# Patient Record
Sex: MALE | Race: WHITE | NOT HISPANIC OR LATINO | Employment: OTHER | URBAN - METROPOLITAN AREA
[De-identification: names, ages, dates, MRNs, and addresses within clinical notes are randomized per-mention and may not be internally consistent; named-entity substitution may affect disease eponyms.]

---

## 2019-09-25 ENCOUNTER — OFFICE VISIT (OUTPATIENT)
Dept: FAMILY MEDICINE CLINIC | Facility: CLINIC | Age: 44
End: 2019-09-25

## 2019-09-25 VITALS
HEIGHT: 73 IN | DIASTOLIC BLOOD PRESSURE: 76 MMHG | TEMPERATURE: 98.2 F | WEIGHT: 206.8 LBS | SYSTOLIC BLOOD PRESSURE: 122 MMHG | OXYGEN SATURATION: 99 % | HEART RATE: 68 BPM | BODY MASS INDEX: 27.41 KG/M2 | RESPIRATION RATE: 16 BRPM

## 2019-09-25 DIAGNOSIS — M79.672 LEFT FOOT PAIN: ICD-10-CM

## 2019-09-25 DIAGNOSIS — G62.9 NEUROPATHY: Primary | ICD-10-CM

## 2019-09-25 PROCEDURE — 99203 OFFICE O/P NEW LOW 30 MIN: CPT | Performed by: PHYSICIAN ASSISTANT

## 2019-09-25 RX ORDER — ERGOCALCIFEROL 1.25 MG/1
50000 CAPSULE ORAL WEEKLY
Refills: 5 | COMMUNITY
Start: 2019-09-03 | End: 2019-10-14 | Stop reason: SDUPTHER

## 2019-09-25 RX ORDER — METHADONE HYDROCHLORIDE 10 MG/1
TABLET ORAL
COMMUNITY
End: 2022-10-17 | Stop reason: ALTCHOICE

## 2019-09-25 RX ORDER — OMEPRAZOLE 20 MG/1
20 CAPSULE, DELAYED RELEASE ORAL DAILY
Refills: 5 | COMMUNITY
Start: 2019-09-03 | End: 2019-10-14 | Stop reason: SDUPTHER

## 2019-09-25 RX ORDER — CETIRIZINE HYDROCHLORIDE 10 MG/1
TABLET ORAL
Refills: 5 | COMMUNITY
Start: 2019-09-03 | End: 2020-03-09 | Stop reason: SDUPTHER

## 2019-09-25 RX ORDER — DICLOFENAC SODIUM 75 MG/1
1 TABLET, DELAYED RELEASE ORAL 2 TIMES DAILY
Refills: 2 | COMMUNITY
Start: 2019-09-03 | End: 2019-10-14 | Stop reason: SDUPTHER

## 2019-09-25 RX ORDER — GABAPENTIN 800 MG/1
1 TABLET ORAL 3 TIMES DAILY
Refills: 5 | COMMUNITY
Start: 2019-07-31 | End: 2020-08-05 | Stop reason: SDUPTHER

## 2019-09-25 NOTE — PROGRESS NOTES
Ivan Manning is a 44 y.o. male  Establish Care (Lives in [place]-former PCP left practice) and Neuropathy (Bilat feet. RF gabapentin 800mg TID)      History of Present Illness  Patient is a 44-year-old white patient male who comes in complaining of neuropathy chronic history needs refill of gabapentin sent secondary to a fracture of his left foot he has chronic foot pain bilaterally patient like referral to podiatry  The following portions of the patient's history were reviewed and updated as appropriate: allergies, current medications, past social history and problem list    Review of Systems   Constitutional: Negative for appetite change, diaphoresis, fatigue and unexpected weight change.   Eyes: Negative for visual disturbance.   Respiratory: Negative for cough, chest tightness and shortness of breath.    Cardiovascular: Negative for chest pain, palpitations and leg swelling.   Gastrointestinal: Negative for diarrhea, nausea and vomiting.   Endocrine: Negative for polydipsia, polyphagia and polyuria.   Skin: Negative for color change and rash.   Neurological: Negative for dizziness, syncope, weakness, light-headedness, numbness and headaches.       Objective     Vitals:    09/25/19 1119   BP: 122/76   Pulse: 68   Resp: 16   Temp: 98.2 °F (36.8 °C)   SpO2: 99%       Physical Exam   Constitutional: He appears well-developed and well-nourished.   Neck: Neck supple. No JVD present. No thyromegaly present.   Cardiovascular: Normal rate, regular rhythm, normal heart sounds, intact distal pulses and normal pulses.   No murmur heard.  Pulmonary/Chest: Effort normal and breath sounds normal. No respiratory distress.   Abdominal: Soft. Bowel sounds are normal. There is no hepatosplenomegaly. There is no tenderness.   Musculoskeletal: He exhibits no edema.        Lymphadenopathy:     He has no cervical adenopathy.   Neurological: No sensory deficit.   Skin: Skin is warm and dry. He is not diaphoretic.    Nursing note and vitals reviewed.      Assessment/Plan     Diagnoses and all orders for this visit:    Neuropathy    Left foot pain  -     Ambulatory Referral to Podiatry    Other orders  -     cetirizine (zyrTEC) 10 MG tablet; TAKE ONE TABLET BY MOUTH ONCE DAILY FOR ALLERGY  -     gabapentin (NEURONTIN) 800 MG tablet; Take 1 tablet by mouth 3 (Three) Times a Day.  -     diclofenac (VOLTAREN) 75 MG EC tablet; Take 1 tablet by mouth 2 (Two) Times a Day.  -     vitamin D (ERGOCALCIFEROL) 92904 units capsule capsule; Take 50,000 Units by mouth 1 (One) Time Per Week.  -     methadone (DOLOPHINE) 10 MG tablet; methadone 10 mg tablet   Take 8 tablets every day by oral route as directed.  -     metoprolol tartrate (LOPRESSOR) 25 MG tablet; TAKE ONE TABLET BY MOUTH TWO TIMES A DAY (EVERY 12 HOURS)  -     omeprazole (priLOSEC) 20 MG capsule; Take 20 mg by mouth Daily.    #1 gabapentin 800 mg 3 times daily dispense 90×3 refills    Follow-up if no better

## 2019-10-14 ENCOUNTER — TELEPHONE (OUTPATIENT)
Dept: FAMILY MEDICINE CLINIC | Facility: CLINIC | Age: 44
End: 2019-10-14

## 2019-10-14 RX ORDER — OMEPRAZOLE 20 MG/1
20 CAPSULE, DELAYED RELEASE ORAL DAILY
Qty: 30 CAPSULE | Refills: 3 | Status: SHIPPED | OUTPATIENT
Start: 2019-10-14 | End: 2020-03-05

## 2019-10-14 RX ORDER — ERGOCALCIFEROL 1.25 MG/1
50000 CAPSULE ORAL WEEKLY
Qty: 5 CAPSULE | Refills: 3 | Status: SHIPPED | OUTPATIENT
Start: 2019-10-14 | End: 2020-03-03 | Stop reason: SDUPTHER

## 2019-10-14 RX ORDER — DICLOFENAC SODIUM 75 MG/1
75 TABLET, DELAYED RELEASE ORAL 2 TIMES DAILY
Qty: 60 TABLET | Refills: 3 | Status: SHIPPED | OUTPATIENT
Start: 2019-10-14 | End: 2020-03-05

## 2019-10-14 NOTE — TELEPHONE ENCOUNTER
----- Message from Camille Starkey sent at 10/14/2019  2:07 PM EDT -----  Contact: PT.  PT. IS NEEDING MEDICATIONS OF: diclofenac (VOLTAREN) 75 MG EC tablet  2 9/3/2019    Sig - Route: Take 1 tablet by mouth 2 (Two) Times a Day. - Oral     &  omeprazole (priLOSEC) 20 MG capsule  5 9/3/2019    Sig - Route: Take 20 mg by mouth Daily. - Oral     & metoprolol tartrate (LOPRESSOR) 25 MG tablet  3 9/3/2019    Sig: TAKE ONE TABLET BY MOUTH TWO TIMES A DAY (EVERY 12 HOURS)   Class: Historical Med     & vitamin D (ERGOCALCIFEROL) 06724 units capsule capsule  5 9/3/2019    Sig - Route: Take 50,000 Units by mouth 1 (One) Time Per Week. - Oral   Class: Historical Med     RX=[facility] - [address] - [phone]  - [phone] (Phone)  [phone] (Fax)    PT. CAN BE REACHED @ CELL PHONE #: [phone] OR ABOVE HOME #.

## 2020-01-09 ENCOUNTER — OFFICE VISIT (OUTPATIENT)
Dept: FAMILY MEDICINE CLINIC | Facility: CLINIC | Age: 45
End: 2020-01-09

## 2020-01-09 VITALS
SYSTOLIC BLOOD PRESSURE: 116 MMHG | OXYGEN SATURATION: 98 % | HEIGHT: 73 IN | BODY MASS INDEX: 27.73 KG/M2 | WEIGHT: 209.2 LBS | HEART RATE: 65 BPM | RESPIRATION RATE: 16 BRPM | DIASTOLIC BLOOD PRESSURE: 74 MMHG

## 2020-01-09 DIAGNOSIS — D17.9 MULTIPLE LIPOMAS: ICD-10-CM

## 2020-01-09 DIAGNOSIS — G62.9 NEUROPATHY: Primary | ICD-10-CM

## 2020-01-09 PROCEDURE — 99213 OFFICE O/P EST LOW 20 MIN: CPT | Performed by: PHYSICIAN ASSISTANT

## 2020-01-14 NOTE — PROGRESS NOTES
Subjective   Hussein Kerri is a 44 y.o. male  Neuropathy (RF gabapentin 800mg TID) and Knots (Bilat flanks under skin, tender)      History of Present Illness  Patient is a 44-year-old white male who comes in complaining of neuropathy takes gabapentin 3 mg 3 times a day meds working well no problems or complaints pay states meds currently working well symptoms are stable neuropathy has been controlled    Patient complains of knots on abdomen and back notes couple knots couple months ago he states he notices it more when he loses weight but not painful  The following portions of the patient's history were reviewed and updated as appropriate: allergies, current medications, past social history and problem list    Review of Systems   Constitutional: Negative for fatigue and unexpected weight change.   Respiratory: Negative for cough, chest tightness and shortness of breath.    Cardiovascular: Negative for chest pain, palpitations and leg swelling.   Gastrointestinal: Negative for nausea.   Skin: Negative for color change and rash.   Neurological: Negative for dizziness, syncope, weakness and headaches.       Objective     Vitals:    01/09/20 1526   BP: 116/74   Pulse: 65   Resp: 16   SpO2: 98%       Physical Exam   Constitutional: He appears well-developed and well-nourished.   Neck: No JVD present.   Cardiovascular: Normal rate, regular rhythm, normal heart sounds, intact distal pulses and normal pulses.   No murmur heard.  Pulmonary/Chest: Effort normal and breath sounds normal. No respiratory distress.   Abdominal: Soft. Bowel sounds are normal. There is no hepatosplenomegaly. There is no tenderness.   Musculoskeletal: He exhibits no edema.   Skin: Skin is warm and dry.        Nursing note and vitals reviewed.      Assessment/Plan     Diagnoses and all orders for this visit:    Neuropathy    Multiple lipomas    Other orders  -     Multiple Vitamins-Minerals (MULTIVITAMIN ADULT PO); Take 1 tablet by mouth Daily.    #1   gabapentin 800 mg 3 times a day dispense 90×5 refill    2.  No other further treatment needed patient is told to follow-up with general surgery if lipomas got bigger we will make the referral but at this point there is nothing else to do.

## 2020-03-03 NOTE — TELEPHONE ENCOUNTER
Pt called to refill medication below  omeprazole (priLOSEC) 20 MG capsule  Pt asking for dose increase on Omeprazole from 20 to 40  metoprolol tartrate (LOPRESSOR) 25 MG tablet  diclofenac (VOLTAREN) 75 MG EC tablet. Pharmacy already sent it in. Pt would like to add vitamin D (ERGOCALCIFEROL) 57298 units capsule capsule.. Please advise    [facility]    Pt can be reached at [phone]

## 2020-03-05 RX ORDER — DICLOFENAC SODIUM 75 MG/1
TABLET, DELAYED RELEASE ORAL
Qty: 60 TABLET | Refills: 3 | Status: SHIPPED | OUTPATIENT
Start: 2020-03-05 | End: 2020-07-29

## 2020-03-05 RX ORDER — ERGOCALCIFEROL 1.25 MG/1
50000 CAPSULE ORAL WEEKLY
Qty: 5 CAPSULE | Refills: 3 | Status: SHIPPED | OUTPATIENT
Start: 2020-03-05 | End: 2020-08-26

## 2020-03-05 RX ORDER — OMEPRAZOLE 20 MG/1
CAPSULE, DELAYED RELEASE ORAL
Qty: 30 CAPSULE | Refills: 3 | Status: SHIPPED | OUTPATIENT
Start: 2020-03-05 | End: 2020-07-29

## 2020-03-09 ENCOUNTER — OFFICE VISIT (OUTPATIENT)
Dept: FAMILY MEDICINE CLINIC | Facility: CLINIC | Age: 45
End: 2020-03-09

## 2020-03-09 ENCOUNTER — HOSPITAL ENCOUNTER (OUTPATIENT)
Dept: GENERAL RADIOLOGY | Facility: HOSPITAL | Age: 45
Discharge: HOME | End: 2020-03-09
Admitting: PHYSICIAN ASSISTANT

## 2020-03-09 VITALS
DIASTOLIC BLOOD PRESSURE: 78 MMHG | WEIGHT: 206.6 LBS | RESPIRATION RATE: 14 BRPM | HEART RATE: 67 BPM | BODY MASS INDEX: 27.38 KG/M2 | OXYGEN SATURATION: 98 % | SYSTOLIC BLOOD PRESSURE: 112 MMHG | HEIGHT: 73 IN

## 2020-03-09 DIAGNOSIS — M25.572 CHRONIC PAIN OF BOTH ANKLES: ICD-10-CM

## 2020-03-09 DIAGNOSIS — G89.29 CHRONIC PAIN OF BOTH ANKLES: ICD-10-CM

## 2020-03-09 DIAGNOSIS — M25.551 HIP PAIN, BILATERAL: Primary | ICD-10-CM

## 2020-03-09 DIAGNOSIS — M25.571 CHRONIC PAIN OF BOTH ANKLES: ICD-10-CM

## 2020-03-09 DIAGNOSIS — M25.552 HIP PAIN, BILATERAL: Primary | ICD-10-CM

## 2020-03-09 DIAGNOSIS — M25.552 HIP PAIN, BILATERAL: ICD-10-CM

## 2020-03-09 DIAGNOSIS — M25.551 HIP PAIN, BILATERAL: ICD-10-CM

## 2020-03-09 PROCEDURE — 73610 X-RAY EXAM OF ANKLE: CPT

## 2020-03-09 PROCEDURE — 99213 OFFICE O/P EST LOW 20 MIN: CPT | Performed by: PHYSICIAN ASSISTANT

## 2020-03-09 PROCEDURE — 73522 X-RAY EXAM HIPS BI 3-4 VIEWS: CPT

## 2020-03-09 RX ORDER — CETIRIZINE HYDROCHLORIDE 10 MG/1
TABLET ORAL
Qty: 30 TABLET | Refills: 11 | Status: SHIPPED | OUTPATIENT
Start: 2020-03-09 | End: 2021-04-08

## 2020-03-12 NOTE — PROGRESS NOTES
Subjective   Hussein Manning is a 44 y.o. male  Ankle Pain (Bilat ankles, constant pain); Nose Problem (Swelling in Rt nostril); and Hip Pain (Rt hip, poss from fall in 2013)      History of Present Illness  Patient is a 44-year-old white male who comes in complaining of ankle pain bilaterally patient's pain is worse with sitting standing for long peers of time both right left ankles also complains of bilateral hip pain pain with sitting standing for long periods of time he had a history of trauma in both hips and ankles pain is 7 out of 10 worse with sitting standing for long periods of time.  No over-the-counter medication use  The following portions of the patient's history were reviewed and updated as appropriate: allergies, current medications, past social history and problem list    Review of Systems   Constitutional: Negative for fatigue and unexpected weight change.   Respiratory: Negative for cough, chest tightness and shortness of breath.    Cardiovascular: Negative for chest pain, palpitations and leg swelling.   Gastrointestinal: Negative for nausea.   Musculoskeletal: Positive for back pain.   Skin: Negative for color change and rash.   Neurological: Negative for dizziness, syncope, weakness and headaches.       Objective     Vitals:    03/09/20 1018   BP: 112/78   Pulse: 67   Resp: 14   SpO2: 98%       Physical Exam   Constitutional: He appears well-developed and well-nourished.   Neck: Neck supple. No JVD present. No thyromegaly present.   Cardiovascular: Normal rate, regular rhythm, normal heart sounds, intact distal pulses and normal pulses.   No murmur heard.  Pulmonary/Chest: Effort normal and breath sounds normal. No respiratory distress.   Abdominal: Soft. Bowel sounds are normal. There is no hepatosplenomegaly. There is no tenderness.   Musculoskeletal: He exhibits no edema.        Right hip: He exhibits decreased range of motion and decreased strength.        Left hip: He exhibits decreased range  of motion and decreased strength.        Right ankle: Tenderness. Lateral malleolus and medial malleolus tenderness found.        Left ankle: Tenderness. Lateral malleolus and medial malleolus tenderness found.   Lymphadenopathy:     He has no cervical adenopathy.   Neurological: No sensory deficit.   Skin: Skin is warm and dry. He is not diaphoretic.   Nursing note and vitals reviewed.      Assessment/Plan     Hussein was seen today for ankle pain, nose problem and hip pain.    Diagnoses and all orders for this visit:    Hip pain, bilateral  -     Cancel: XR Hips Bilateral With or Without Pelvis 2 View; Future    Chronic pain of both ankles  -     XR Ankle 3+ View Bilateral; Future    Other orders  -     cetirizine (zyrTEC) 10 MG tablet; Take one tablet by mouth once daily

## 2020-03-12 NOTE — PROGRESS NOTES
I have reviewed the notes, assessments, and/or procedures performed by Max Montana PA-C, I concur with his documentation of Hussein Manning.

## 2020-06-25 ENCOUNTER — TELEPHONE (OUTPATIENT)
Dept: FAMILY MEDICINE CLINIC | Facility: CLINIC | Age: 45
End: 2020-06-25

## 2020-06-25 NOTE — TELEPHONE ENCOUNTER
PT CALLED AND STATED HE HAS BUMPS THAT HAVE COME UP ON HIS RIGHT SKIN AND ARE ITCHING    PT IS REQUESTING A CREAM BE CALLED IN FOR HIM    [facility] - [place], [place] - [address] [phone] [facility] [phone] FX

## 2020-06-29 ENCOUNTER — TELEPHONE (OUTPATIENT)
Dept: FAMILY MEDICINE CLINIC | Facility: CLINIC | Age: 45
End: 2020-06-29

## 2020-06-29 RX ORDER — TRIAMCINOLONE ACETONIDE 1 MG/G
CREAM TOPICAL 2 TIMES DAILY
Qty: 45 G | Refills: 0 | Status: SHIPPED | OUTPATIENT
Start: 2020-06-29 | End: 2020-08-26 | Stop reason: SDUPTHER

## 2020-06-29 NOTE — TELEPHONE ENCOUNTER
PHARMACY STATES THAT THE MEDICATION CREAM LIDEX THAT WAS PRESCRIBED TO PT IS UNAVAILABLE TO MANUFACTURE.    PHARMACY IS REQUESTING A DIFFERENT MEDICATION CREAM    PLEASE ADVISE    [facility]   [phone]

## 2020-07-16 ENCOUNTER — TELEPHONE (OUTPATIENT)
Dept: FAMILY MEDICINE CLINIC | Facility: CLINIC | Age: 45
End: 2020-07-16

## 2020-07-16 NOTE — TELEPHONE ENCOUNTER
Patient called and stated that his gabapentin (NEURONTIN) 800 MG tablet prescription will run out on 7/26/20.  Patient stated that he has a auto-immune disease, 2 hours away and wants to do a video appointment.  There were no video appointments available.    Patient callback: [phone]  Or [phone]    Please advise

## 2020-07-27 NOTE — TELEPHONE ENCOUNTER
Caller: Lorenza Manning    Relationship: wife    Best call back number: [phone]    Medication needed: gabapentin 800mg    When do you need the refill by: 07/27/20    What details did the patient provide when requesting the medication: patient is out of medicine and is unable to make it to office to see provider due to health issues.     Does the patient have less than a 3 day supply:  [x] Yes  [] No    What is the patient's preferred pharmacy: [facility] [address] [phone] [facility] [phone]

## 2020-07-29 RX ORDER — DICLOFENAC SODIUM 75 MG/1
TABLET, DELAYED RELEASE ORAL
Qty: 60 TABLET | Refills: 1 | Status: SHIPPED | OUTPATIENT
Start: 2020-07-29 | End: 2020-08-05 | Stop reason: SDUPTHER

## 2020-07-29 RX ORDER — GABAPENTIN 800 MG/1
800 TABLET ORAL 3 TIMES DAILY
Refills: 5 | OUTPATIENT
Start: 2020-07-29

## 2020-07-29 RX ORDER — OMEPRAZOLE 20 MG/1
CAPSULE, DELAYED RELEASE ORAL
Qty: 30 CAPSULE | Refills: 1 | Status: SHIPPED | OUTPATIENT
Start: 2020-07-29 | End: 2020-09-29

## 2020-08-05 ENCOUNTER — TELEMEDICINE (OUTPATIENT)
Dept: FAMILY MEDICINE CLINIC | Facility: CLINIC | Age: 45
End: 2020-08-05

## 2020-08-05 DIAGNOSIS — G62.9 NEUROPATHY: Primary | ICD-10-CM

## 2020-08-05 DIAGNOSIS — M25.512 PAIN OF BOTH SHOULDER JOINTS: ICD-10-CM

## 2020-08-05 DIAGNOSIS — M25.511 PAIN OF BOTH SHOULDER JOINTS: ICD-10-CM

## 2020-08-05 PROCEDURE — 99213 OFFICE O/P EST LOW 20 MIN: CPT | Performed by: PHYSICIAN ASSISTANT

## 2020-08-05 RX ORDER — GABAPENTIN 800 MG/1
800 TABLET ORAL 3 TIMES DAILY
Qty: 90 TABLET | Refills: 5 | OUTPATIENT
Start: 2020-08-05 | End: 2021-02-01 | Stop reason: SDUPTHER

## 2020-08-05 RX ORDER — DICLOFENAC SODIUM 75 MG/1
75 TABLET, DELAYED RELEASE ORAL 2 TIMES DAILY
Qty: 60 TABLET | Refills: 11 | Status: SHIPPED | OUTPATIENT
Start: 2020-08-05 | End: 2021-08-20 | Stop reason: SDUPTHER

## 2020-08-05 NOTE — PROGRESS NOTES
Subjective   Hussein Manning is a 45 y.o. male  Leg Pain    Video visit  History of Present Illness  Patient is a pleasant 45-year-old white male who presents for video visit needs refill of his gabapentin 8 oh milligrams 3 times a day for chronic neuropathy/leg pain hip pain also needs refill of diclofenac patient doing well meds working well he is scheduled for surgery on his ankle later in the month patient states he has no side effects from medication gabapentin is working well for his neuropathy pain symptoms are controlled doing well patient has bilateral ankle pain shoulder pain and hip pain  Bilateral shoulder pain is worse in the mornings  The following portions of the patient's history were reviewed and updated as appropriate: allergies, current medications, past social history and problem list    Review of Systems   Constitutional: Negative for fatigue and unexpected weight change.   Respiratory: Negative for cough, chest tightness and shortness of breath.    Cardiovascular: Negative for chest pain, palpitations and leg swelling.   Gastrointestinal: Negative for nausea.   Musculoskeletal: Positive for arthralgias, back pain, joint swelling and neck pain.   Skin: Negative for color change and rash.   Neurological: Negative for dizziness, syncope, weakness and headaches.       Objective     There were no vitals filed for this visit.    Physical Exam   Constitutional: He appears well-developed and well-nourished.   Psychiatric: He has a normal mood and affect. His behavior is normal. Judgment and thought content normal.       Assessment/Plan     Hussein was seen today for leg pain.    Diagnoses and all orders for this visit:    Neuropathy    Pain of both shoulder joints  -     diclofenac (VOLTAREN) 75 MG EC tablet; Take 1 tablet by mouth 2 (Two) Times a Day.    Gabapentin 800 mg 3 times a day dispense 90    Follow-up if no better    Spent 16 minutes patient discussing treatment plan long-term treatment discussed  ways to improve chronic pain

## 2020-08-26 RX ORDER — TRIAMCINOLONE ACETONIDE 1 MG/G
CREAM TOPICAL 2 TIMES DAILY
Qty: 45 G | Refills: 0 | Status: SHIPPED | OUTPATIENT
Start: 2020-08-26 | End: 2020-09-29

## 2020-08-26 RX ORDER — ERGOCALCIFEROL 1.25 MG/1
CAPSULE ORAL
Qty: 5 CAPSULE | Refills: 3 | Status: SHIPPED | OUTPATIENT
Start: 2020-08-26 | End: 2022-10-17 | Stop reason: SDUPTHER

## 2020-08-26 NOTE — TELEPHONE ENCOUNTER
REFILL REQUEST:     Disp Refills Start End    triamcinolone (KENALOG) 0.1 % cream 45 g 0 6/29/2020     Sig - Route: Apply  topically to the appropriate area as directed 2 (Two) Times a Day. - Topical                Pharmacy     [facility] - [place], [place] - [address] [place] [address] [phone] [facility] [phone]

## 2020-09-29 RX ORDER — TRIAMCINOLONE ACETONIDE 1 MG/G
CREAM TOPICAL
Qty: 45 G | Refills: 0 | Status: SHIPPED | OUTPATIENT
Start: 2020-09-29 | End: 2022-10-17

## 2020-09-29 RX ORDER — OMEPRAZOLE 20 MG/1
CAPSULE, DELAYED RELEASE ORAL
Qty: 30 CAPSULE | Refills: 1 | Status: SHIPPED | OUTPATIENT
Start: 2020-09-29 | End: 2021-02-09

## 2021-02-01 ENCOUNTER — TELEMEDICINE (OUTPATIENT)
Dept: FAMILY MEDICINE CLINIC | Facility: CLINIC | Age: 46
End: 2021-02-01

## 2021-02-01 DIAGNOSIS — G62.9 NEUROPATHY: Primary | ICD-10-CM

## 2021-02-01 DIAGNOSIS — G62.9 NEUROPATHY: ICD-10-CM

## 2021-02-01 PROCEDURE — 99212 OFFICE O/P EST SF 10 MIN: CPT | Performed by: PHYSICIAN ASSISTANT

## 2021-02-01 RX ORDER — GABAPENTIN 800 MG/1
800 TABLET ORAL 3 TIMES DAILY
Qty: 90 TABLET | Refills: 5 | Status: SHIPPED | OUTPATIENT
Start: 2021-02-01 | End: 2021-08-20 | Stop reason: SDUPTHER

## 2021-02-01 NOTE — PROGRESS NOTES
Subjective   Hussein Manning is a 45 y.o. male  Leg Pain (Video visit)      History of Present Illness  -A pleasant 45-year-old white male who presents for bilateral neuropathy needs refill of medication has body sensation both right and left legs has gabapentin 800 mg 3 times a day which controls symptoms needs refill  The following portions of the patient's history were reviewed and updated as appropriate: allergies, current medications, past social history and problem list    Review of Systems   Constitutional: Negative for fatigue and unexpected weight change.   Respiratory: Negative for cough, chest tightness and shortness of breath.    Cardiovascular: Negative for chest pain, palpitations and leg swelling.   Gastrointestinal: Negative for nausea.   Skin: Negative for color change and rash.   Neurological: Negative for dizziness, syncope, weakness and headaches.       Objective     There were no vitals filed for this visit.    Physical Exam  Constitutional:       Appearance: Normal appearance. He is normal weight.   Neurological:      Mental Status: He is alert.   Psychiatric:         Mood and Affect: Mood normal.         Behavior: Behavior normal.         Thought Content: Thought content normal.         Judgment: Judgment normal.         Assessment/Plan     Diagnoses and all orders for this visit:    1. Neuropathy (Primary)    #1 gabapentin 800 mg 3 times a day dispense 90x5 refills    Time spent with patient 15 minutes discussed treatment plan long-term treatment plan discussed ways to improve overall

## 2021-02-09 DIAGNOSIS — K21.9 GASTROESOPHAGEAL REFLUX DISEASE WITHOUT ESOPHAGITIS: Primary | ICD-10-CM

## 2021-02-09 RX ORDER — OMEPRAZOLE 20 MG/1
CAPSULE, DELAYED RELEASE ORAL
Qty: 30 CAPSULE | Refills: 5 | Status: SHIPPED | OUTPATIENT
Start: 2021-02-09 | End: 2022-05-10 | Stop reason: SDUPTHER

## 2021-02-26 ENCOUNTER — TELEPHONE (OUTPATIENT)
Dept: FAMILY MEDICINE CLINIC | Facility: CLINIC | Age: 46
End: 2021-02-26

## 2021-02-26 NOTE — TELEPHONE ENCOUNTER
PATIENT CALLED AND WANTED TO KNOW IF IT WAS OK FOR HIM TO TAKE CBD OIL FOR HIS PAIN; HE IS CURRENTLY ON METHADONE AND HIS [facility] TH[facility]AP[facility] SAID THIS WOULD HAVE TO BE APPROVED BY [facility] IN A LETTER TO THEM; PLEASE CALL TO ADVISE    [facility]: [phone]

## 2021-04-08 DIAGNOSIS — J30.9 ALLERGIC RHINITIS, UNSPECIFIED SEASONALITY, UNSPECIFIED TRIGGER: Primary | ICD-10-CM

## 2021-04-09 RX ORDER — CETIRIZINE HYDROCHLORIDE 10 MG/1
TABLET ORAL
Qty: 30 TABLET | Refills: 11 | Status: SHIPPED | OUTPATIENT
Start: 2021-04-09 | End: 2022-10-17 | Stop reason: SDUPTHER

## 2021-07-15 ENCOUNTER — TELEPHONE (OUTPATIENT)
Dept: FAMILY MEDICINE CLINIC | Facility: CLINIC | Age: 46
End: 2021-07-15

## 2021-07-15 NOTE — TELEPHONE ENCOUNTER
Caller: Hussein Manning    Relationship: Self    Best call back number: [phone]    What is the medical concern/diagnosis: WANTS TO COME DOWN OFF PAIN MEDICATION     What specialty or service is being requested: PAIN MANAGEMENT    What is the provider, practice or medical service name:     What is the office location: NA    What is the office phone number: NA    Any additional details: PATIENT STATES HE IS ON 80MG AT THE METHADONE CLINIC AND HE BELIEVES THIS IS A HIGH DOSE. PATIENT WANTS TO COME DOWN OFF OF THIS AND THINKS PAIN MANAGEMENT WOULD BE MORE HELP

## 2021-07-16 DIAGNOSIS — G89.29 OTHER CHRONIC PAIN: Primary | ICD-10-CM

## 2021-07-21 ENCOUNTER — TRANSCRIBE ORDERS (OUTPATIENT)
Dept: FAMILY MEDICINE CLINIC | Facility: CLINIC | Age: 46
End: 2021-07-21

## 2021-08-06 DIAGNOSIS — G62.9 NEUROPATHY: ICD-10-CM

## 2021-08-06 NOTE — TELEPHONE ENCOUNTER
Caller: Hussein Manning    Relationship: Self    Best call back number: [phone]    Medication needed:   Requested Prescriptions     Pending Prescriptions Disp Refills   • gabapentin (NEURONTIN) 800 MG tablet 90 tablet 5     Sig: Take 1 tablet by mouth 3 (Three) Times a Day.   • metoprolol tartrate (LOPRESSOR) 25 MG tablet 60 tablet 5       When do you need the refill by: 08/09/21    What additional details did the patient provide when requesting the medication:     Does the patient have less than a 3 day supply:  [] Yes  [x] No    What is the patient's preferred pharmacy:    [address] [phone] [facility] [phone]

## 2021-08-10 RX ORDER — GABAPENTIN 800 MG/1
800 TABLET ORAL 3 TIMES DAILY
Qty: 90 TABLET | Refills: 5 | OUTPATIENT
Start: 2021-08-10

## 2021-08-20 ENCOUNTER — TELEMEDICINE (OUTPATIENT)
Dept: FAMILY MEDICINE CLINIC | Facility: CLINIC | Age: 46
End: 2021-08-20

## 2021-08-20 DIAGNOSIS — M25.511 PAIN OF BOTH SHOULDER JOINTS: ICD-10-CM

## 2021-08-20 DIAGNOSIS — M25.512 PAIN OF BOTH SHOULDER JOINTS: ICD-10-CM

## 2021-08-20 DIAGNOSIS — M54.40 LOW BACK PAIN WITH SCIATICA, SCIATICA LATERALITY UNSPECIFIED, UNSPECIFIED BACK PAIN LATERALITY, UNSPECIFIED CHRONICITY: Primary | ICD-10-CM

## 2021-08-20 DIAGNOSIS — G62.9 NEUROPATHY: ICD-10-CM

## 2021-08-20 PROCEDURE — 99213 OFFICE O/P EST LOW 20 MIN: CPT | Performed by: PHYSICIAN ASSISTANT

## 2021-08-20 RX ORDER — DICLOFENAC SODIUM 75 MG/1
75 TABLET, DELAYED RELEASE ORAL 2 TIMES DAILY
Qty: 60 TABLET | Refills: 11 | Status: SHIPPED | OUTPATIENT
Start: 2021-08-20 | End: 2022-05-10 | Stop reason: SDUPTHER

## 2021-08-20 NOTE — PROGRESS NOTES
Subjective   Hussein Manning is a 46 y.o. male  Back Pain      History of Present Illness  Patient is a pleasant 46-year-old white male who comes in for chronic back pain needs refill of diclofenac and gabapentin meds working well no problem complaints no shortness of breath no chest pain no SI/HI meds working well pain with sitting standing for long periods of time.  Patient also planes of pain in both shoulders both right and left      The following portions of the patient's history were reviewed and updated as appropriate: allergies, current medications, past social history and problem list    Review of Systems   Constitutional: Negative for fatigue and unexpected weight change.   Respiratory: Negative for cough, chest tightness and shortness of breath.    Cardiovascular: Negative for chest pain, palpitations and leg swelling.   Gastrointestinal: Negative for nausea.   Musculoskeletal: Positive for back pain.   Skin: Negative for color change and rash.   Neurological: Negative for dizziness, syncope, weakness and headaches.       Objective     There were no vitals filed for this visit.    Physical Exam  Constitutional:       Appearance: Normal appearance. He is normal weight.   Neurological:      Mental Status: He is alert.   Psychiatric:         Mood and Affect: Mood normal.         Behavior: Behavior normal.         Thought Content: Thought content normal.         Judgment: Judgment normal.         Assessment/Plan     Diagnoses and all orders for this visit:    1. Low back pain with sciatica, sciatica laterality unspecified, unspecified back pain laterality, unspecified chronicity (Primary)    2. Pain of both shoulder joints  -     diclofenac (VOLTAREN) 75 MG EC tablet; Take 1 tablet by mouth 2 (Two) Times a Day.  Dispense: 60 tablet; Refill: 11    Gabapentin 800 mg 3 times a day dispense dispense 90x3 refills    I spent 15 minutes in patient care: Reviewing records prior to the visit, examining the patient,  entering orders and documentation    Please note that portions of this document were completed with a voice recognition program. Efforts were made to edit the dictations, but occasionally words are mis-transcribed

## 2021-08-22 RX ORDER — GABAPENTIN 800 MG/1
800 TABLET ORAL 3 TIMES DAILY
Qty: 90 TABLET | Refills: 3 | Status: SHIPPED | OUTPATIENT
Start: 2021-08-22 | End: 2022-01-11 | Stop reason: SDUPTHER

## 2021-09-07 ENCOUNTER — TELEMEDICINE (OUTPATIENT)
Dept: FAMILY MEDICINE CLINIC | Facility: CLINIC | Age: 46
End: 2021-09-07

## 2021-09-07 DIAGNOSIS — F51.01 PRIMARY INSOMNIA: Primary | ICD-10-CM

## 2021-09-07 DIAGNOSIS — R11.0 NAUSEA: ICD-10-CM

## 2021-09-07 PROCEDURE — 99213 OFFICE O/P EST LOW 20 MIN: CPT | Performed by: PHYSICIAN ASSISTANT

## 2021-09-07 RX ORDER — TRAZODONE HYDROCHLORIDE 50 MG/1
50 TABLET ORAL NIGHTLY
Qty: 30 TABLET | Refills: 0 | Status: SHIPPED | OUTPATIENT
Start: 2021-09-07 | End: 2022-10-17

## 2021-09-07 RX ORDER — ONDANSETRON HYDROCHLORIDE 8 MG/1
8 TABLET, FILM COATED ORAL EVERY 8 HOURS PRN
Qty: 30 TABLET | Refills: 3 | Status: SHIPPED | OUTPATIENT
Start: 2021-09-07

## 2021-09-07 NOTE — PROGRESS NOTES
Ivan Manning is a 46 y.o. male  No chief complaint on file.    Patient presents and consents for telehealth/video visit examination.  History of Present Illness  Patient is a pleasant 46-year-old male who comes in complaining insomnia trouble sleeping and some nausea he is weaning off methadone he states he like something for nausea and sleep SI/HI concentration is decreased focus is decreased  .  The following portions of the patient's history were reviewed and updated as appropriate: allergies, current medications, past social history and problem list    Review of Systems   Constitutional: Negative for appetite change, diaphoresis, fatigue and unexpected weight change.   Eyes: Negative for visual disturbance.   Respiratory: Negative for cough, chest tightness and shortness of breath.    Cardiovascular: Negative for chest pain, palpitations and leg swelling.   Gastrointestinal: Negative for diarrhea, nausea and vomiting.   Endocrine: Negative for polydipsia, polyphagia and polyuria.   Skin: Negative for color change and rash.   Neurological: Negative for dizziness, syncope, weakness, light-headedness, numbness and headaches.       Objective     There were no vitals filed for this visit.    Physical Exam  Constitutional:       Appearance: Normal appearance.   Neurological:      Mental Status: He is alert.   Psychiatric:         Mood and Affect: Mood normal.         Behavior: Behavior normal.         Thought Content: Thought content normal.         Judgment: Judgment normal.         Assessment/Plan     Diagnoses and all orders for this visit:    1. Primary insomnia (Primary)  -     traZODone (DESYREL) 50 MG tablet; Take 1 tablet by mouth Every Night.  Dispense: 30 tablet; Refill: 0    2. Nausea  -     ondansetron (Zofran) 8 MG tablet; Take 1 tablet by mouth Every 8 (Eight) Hours As Needed for Nausea or Vomiting.  Dispense: 30 tablet; Refill: 3    I spent 15 minutes in patient care: Reviewing records  prior to the visit, examining the patient, entering orders and documentation    Please note that portions of this document were completed with a voice recognition program. Efforts were made to edit the dictations, but occasionally words are mis-transcribed

## 2021-10-25 ENCOUNTER — TELEMEDICINE (OUTPATIENT)
Dept: FAMILY MEDICINE CLINIC | Facility: CLINIC | Age: 46
End: 2021-10-25

## 2021-10-25 DIAGNOSIS — G89.29 CHRONIC PAIN OF RIGHT ANKLE: Primary | ICD-10-CM

## 2021-10-25 DIAGNOSIS — M25.571 CHRONIC PAIN OF RIGHT ANKLE: Primary | ICD-10-CM

## 2021-10-25 DIAGNOSIS — G62.9 NEUROPATHY: ICD-10-CM

## 2021-10-25 PROCEDURE — 99213 OFFICE O/P EST LOW 20 MIN: CPT | Performed by: PHYSICIAN ASSISTANT

## 2021-10-25 NOTE — PROGRESS NOTES
Subjective   Hussein Manning is a 46 y.o. male  Ankle Pain (chronic)    Patient presents and consents for telehealth/video visit examination.  History of Present Illness  Patient is a pleasant 46-year-old male who comes presents for video visit, right ankle pain large amount of stiffness and pain in the ankle patient states pain management also has chronic back pain.  Patient has a history of severe right ankle injury/fracture.  Also has chronic back pain.  Patient with chronic neuropathy and still on methadone in the past for pain control      The following portions of the patient's history were reviewed and updated as appropriate: allergies, current medications, past social history and problem list    Review of Systems   Constitutional: Negative for fatigue and unexpected weight change.   Respiratory: Negative for cough, chest tightness and shortness of breath.    Cardiovascular: Negative for chest pain, palpitations and leg swelling.   Gastrointestinal: Negative for nausea.   Skin: Negative for color change and rash.   Neurological: Negative for dizziness, syncope, weakness and headaches.       Objective     There were no vitals filed for this visit.    Physical Exam  Constitutional:       Appearance: Normal appearance. He is normal weight.   Neurological:      Mental Status: He is alert.   Psychiatric:         Mood and Affect: Mood normal.         Behavior: Behavior normal.         Thought Content: Thought content normal.         Judgment: Judgment normal.         Assessment/Plan     Diagnoses and all orders for this visit:    1. Chronic pain of right ankle (Primary)  -     Ambulatory Referral to Pain Management    2. Neuropathy  -     Ambulatory Referral to Pain Management     I spent 15 minutes in patient care: Reviewing records prior to the visit, examining the patient, entering orders and documentation    Part of this note may be an electronic transcription/translation of spoken language to printed text using  the Dragon Dictation System.

## 2021-10-29 ENCOUNTER — TELEPHONE (OUTPATIENT)
Dept: FAMILY MEDICINE CLINIC | Facility: CLINIC | Age: 46
End: 2021-10-29

## 2021-10-29 NOTE — TELEPHONE ENCOUNTER
PATIENT IS ASKING THAT THE REFERRAL FOR THE PAIN CLINIC BE CANCELED. HE WENT TO [facility] [phone]

## 2022-01-11 ENCOUNTER — TELEMEDICINE (OUTPATIENT)
Dept: FAMILY MEDICINE CLINIC | Facility: CLINIC | Age: 47
End: 2022-01-11

## 2022-01-11 DIAGNOSIS — G43.109 MIGRAINE WITH AURA AND WITHOUT STATUS MIGRAINOSUS, NOT INTRACTABLE: Primary | ICD-10-CM

## 2022-01-11 DIAGNOSIS — G62.9 NEUROPATHY: ICD-10-CM

## 2022-01-11 PROCEDURE — 99214 OFFICE O/P EST MOD 30 MIN: CPT | Performed by: PHYSICIAN ASSISTANT

## 2022-01-11 RX ORDER — GABAPENTIN 800 MG/1
800 TABLET ORAL 3 TIMES DAILY
Qty: 90 TABLET | Refills: 3 | Status: SHIPPED | OUTPATIENT
Start: 2022-01-11 | End: 2022-05-10 | Stop reason: SDUPTHER

## 2022-01-11 RX ORDER — SUMATRIPTAN 100 MG/1
TABLET, FILM COATED ORAL
Qty: 9 TABLET | Refills: 3 | Status: SHIPPED | OUTPATIENT
Start: 2022-01-11 | End: 2022-05-10 | Stop reason: SDUPTHER

## 2022-01-11 NOTE — PROGRESS NOTES
Ivan   Hussein Manning is a 46 y.o. male  Back Pain  Migraine headaches    History of Present Illness  Patient is a pleasant 46-year-old white male who comes in complaining of chronic low back pain needs refill of gabapentin 303 times daily daily states pain is stable taking gabapentin patient has pain under control at this point    Patient has unilateral headache with photophobi phonophobia, pay states he has recurrent headaches some nausea upset stomach patient's wife gave him a sumatriptan which helped headaches like to try this, for his headaches he states he has headaches 2-3 times per week      The following portions of the patient's history were reviewed and updated as appropriate: allergies, current medications, past social history and problem list    Review of Systems   Constitutional: Negative for fatigue and unexpected weight change.   Respiratory: Negative for cough, chest tightness and shortness of breath.    Cardiovascular: Negative for chest pain, palpitations and leg swelling.   Gastrointestinal: Negative for nausea.   Musculoskeletal: Positive for back pain.   Skin: Negative for color change and rash.   Neurological: Positive for headaches. Negative for dizziness, syncope and weakness.       Objective     There were no vitals filed for this visit.    Physical Exam  Vitals and nursing note reviewed.   Constitutional:       General: He is not in acute distress.     Appearance: Normal appearance. He is well-developed. He is not ill-appearing, toxic-appearing or diaphoretic.   Neck:      Vascular: No carotid bruit or JVD.   Cardiovascular:      Rate and Rhythm: Normal rate and regular rhythm.      Pulses: Normal pulses.      Heart sounds: Normal heart sounds. No murmur heard.      Pulmonary:      Effort: Pulmonary effort is normal. No respiratory distress.      Breath sounds: Normal breath sounds.   Abdominal:      Palpations: Abdomen is soft.      Tenderness: There is no abdominal tenderness.    Skin:     General: Skin is warm and dry.   Neurological:      Mental Status: He is alert.         Assessment/Plan     Diagnoses and all orders for this visit:    1. Migraine with aura and without status migrainosus, not intractable (Primary)  -     SUMAtriptan (Imitrex) 100 MG tablet; Take one tablet at onset of headache. May repeat dose one time in 2 hours if headache not relieved.  Dispense: 9 tablet; Refill: 3    2. Neuropathy  -     gabapentin (NEURONTIN) 800 MG tablet; Take 1 tablet by mouth 3 (Three) Times a Day.  Dispense: 90 tablet; Refill: 3    I spent 15 minutes in patient care: Reviewing records prior to the visit, examining the patient, entering orders and documentation    Part of this note may be an electronic transcription/translation of spoken language to printed text using the Dragon Dictation System.

## 2022-05-04 ENCOUNTER — TELEPHONE (OUTPATIENT)
Dept: FAMILY MEDICINE CLINIC | Facility: CLINIC | Age: 47
End: 2022-05-04

## 2022-05-04 NOTE — TELEPHONE ENCOUNTER
Caller: Isidro Manning    Relationship: Self    Best call back number:[phone]    What is the best time to reach you: ANYTIME    Who are you requesting to speak with (clinical staff, provider,  specific staff member): CLINICAL STAFF    Do you know the name of the person who called: ISIDRO PATIENT    What was the call regarding: PATIENT IS IN NEED OF A VIDEO VISIT FOR MEDICATION REQUEST; TRIED WARM TRANSFERE BUT OFFICE WAS UNAVAILE TO ANSWER    gabapentin (NEURONTIN) 800 MG tablet  omeprazole (priLOSEC) 20 MG capsule  diclofenac (VOLTAREN) 75 MG EC tablet  AND NORMAL ALLERGERY MEDICATION    PRIMARY WILL BE COMPLETELY OUT ON 05/11/2022            Do you require a callback: CALL BACK TO SCHEDULE VIDEO VISIT

## 2022-05-10 ENCOUNTER — TELEMEDICINE (OUTPATIENT)
Dept: FAMILY MEDICINE CLINIC | Facility: CLINIC | Age: 47
End: 2022-05-10

## 2022-05-10 DIAGNOSIS — K21.9 GASTROESOPHAGEAL REFLUX DISEASE WITHOUT ESOPHAGITIS: ICD-10-CM

## 2022-05-10 DIAGNOSIS — M25.512 PAIN OF BOTH SHOULDER JOINTS: ICD-10-CM

## 2022-05-10 DIAGNOSIS — M54.40 LOW BACK PAIN WITH SCIATICA, SCIATICA LATERALITY UNSPECIFIED, UNSPECIFIED BACK PAIN LATERALITY, UNSPECIFIED CHRONICITY: ICD-10-CM

## 2022-05-10 DIAGNOSIS — G62.9 NEUROPATHY: ICD-10-CM

## 2022-05-10 DIAGNOSIS — M25.511 PAIN OF BOTH SHOULDER JOINTS: ICD-10-CM

## 2022-05-10 DIAGNOSIS — G43.109 MIGRAINE WITH AURA AND WITHOUT STATUS MIGRAINOSUS, NOT INTRACTABLE: ICD-10-CM

## 2022-05-10 PROCEDURE — 99213 OFFICE O/P EST LOW 20 MIN: CPT | Performed by: PHYSICIAN ASSISTANT

## 2022-05-10 RX ORDER — GABAPENTIN 800 MG/1
800 TABLET ORAL 3 TIMES DAILY
Qty: 90 TABLET | Refills: 3 | Status: SHIPPED | OUTPATIENT
Start: 2022-05-10 | End: 2022-09-12

## 2022-05-10 RX ORDER — SUMATRIPTAN 100 MG/1
TABLET, FILM COATED ORAL
Qty: 9 TABLET | Refills: 3 | Status: SHIPPED | OUTPATIENT
Start: 2022-05-10 | End: 2022-09-12

## 2022-05-10 RX ORDER — OMEPRAZOLE 20 MG/1
20 CAPSULE, DELAYED RELEASE ORAL DAILY
Qty: 30 CAPSULE | Refills: 5 | Status: SHIPPED | OUTPATIENT
Start: 2022-05-10 | End: 2022-10-17 | Stop reason: SDUPTHER

## 2022-05-10 RX ORDER — DICLOFENAC SODIUM 75 MG/1
75 TABLET, DELAYED RELEASE ORAL 2 TIMES DAILY
Qty: 60 TABLET | Refills: 11 | Status: SHIPPED | OUTPATIENT
Start: 2022-05-10

## 2022-05-10 NOTE — PROGRESS NOTES
Subjective   Hussein Manning is a 46 y.o. male  Shoulder Pain  Neuropathy  Low back pain  GERD    Patient presents and consents for telehealth/video visit examination.  History of Present Illness  Patient is a pleasant 46-year-old white male who comes in for video visit, presents with bilateral shoulder pain neuropathy low back pain GERD plan all chronic conditions need refill meds patient states symptoms are controlled no breakthrough problems on medication.  The following portions of the patient's history were reviewed and updated as appropriate: allergies, current medications, past social history and problem list    Review of Systems   Constitutional: Negative for fatigue and unexpected weight change.   Respiratory: Negative for cough, chest tightness and shortness of breath.    Cardiovascular: Negative for chest pain, palpitations and leg swelling.   Gastrointestinal: Positive for abdominal pain. Negative for nausea.   Musculoskeletal: Positive for arthralgias and back pain.   Skin: Negative for color change and rash.   Neurological: Negative for dizziness, syncope, weakness and headaches.       Objective     There were no vitals filed for this visit.    Physical Exam  Constitutional:       Appearance: Normal appearance.   Neurological:      Mental Status: He is alert.   Psychiatric:         Mood and Affect: Mood normal.         Behavior: Behavior normal.         Thought Content: Thought content normal.         Judgment: Judgment normal.           Diagnoses and all orders for this visit:    1. Neuropathy  -     gabapentin (NEURONTIN) 800 MG tablet; Take 1 tablet by mouth 3 (Three) Times a Day.  Dispense: 90 tablet; Refill: 3    2. Migraine with aura and without status migrainosus, not intractable  -     SUMAtriptan (Imitrex) 100 MG tablet; Take one tablet at onset of headache. May repeat dose one time in 2 hours if headache not relieved.  Dispense: 9 tablet; Refill: 3    3. Pain of both shoulder joints  -      diclofenac (VOLTAREN) 75 MG EC tablet; Take 1 tablet by mouth 2 (Two) Times a Day.  Dispense: 60 tablet; Refill: 11    4. Low back pain with sciatica, sciatica laterality unspecified, unspecified back pain laterality, unspecified chronicity  -     diclofenac (VOLTAREN) 75 MG EC tablet; Take 1 tablet by mouth 2 (Two) Times a Day.  Dispense: 60 tablet; Refill: 11    5. Gastroesophageal reflux disease without esophagitis  -     omeprazole (priLOSEC) 20 MG capsule; Take 1 capsule by mouth Daily.  Dispense: 30 capsule; Refill: 5     Preventive medicine discussed, diet, exercise, healthy living discussed at length.  Discussed nutrition, physical activity, healthy weight, injury prevention, misuse of tobacco, alcohol and drugs, dental health, mental health, immunizations, screening    Part of this note may be an electronic transcription/translation of spoken language to printed text using the Dragon Dictation System.

## 2022-06-08 ENCOUNTER — TELEPHONE (OUTPATIENT)
Dept: FAMILY MEDICINE CLINIC | Facility: CLINIC | Age: 47
End: 2022-06-08

## 2022-06-09 DIAGNOSIS — M25.571 CHRONIC PAIN OF RIGHT ANKLE: Primary | ICD-10-CM

## 2022-06-09 DIAGNOSIS — G89.29 CHRONIC PAIN OF RIGHT ANKLE: Primary | ICD-10-CM

## 2022-07-12 NOTE — TELEPHONE ENCOUNTER
PATIENT STATES THAT HE RECIEVED A CALL AND WAS ASKING A LOT OF INFORMATION. HE SPOKE WITH DR CUMMINGS'S OFFICE ABOUT IT AND THEY SAID THAT THEY WOULDN'T NEED THAT INFORMATION UNLESS THEY WERE PROVIDING SURGERY. HE IS WANTING TO KNOW IF IT WAS THIS OFFICE THAT HE SPOKE WITH. PATIENT IS WORRIED THAT HE MIGHT HAVE GIVEN HIS INFORMATION THAT SOMEONE HE SHOULD NOT HAVE.

## 2022-07-12 NOTE — TELEPHONE ENCOUNTER
I spoke with patient and told him that there is a lot of communication regarding referrals and different ankle doctors needing information, most recently at the end of the June.

## 2022-09-12 DIAGNOSIS — G43.109 MIGRAINE WITH AURA AND WITHOUT STATUS MIGRAINOSUS, NOT INTRACTABLE: ICD-10-CM

## 2022-09-12 DIAGNOSIS — G62.9 NEUROPATHY: ICD-10-CM

## 2022-09-12 RX ORDER — GABAPENTIN 800 MG/1
800 TABLET ORAL 3 TIMES DAILY
Qty: 90 TABLET | Refills: 0 | Status: SHIPPED | OUTPATIENT
Start: 2022-09-12 | End: 2022-10-17 | Stop reason: SDUPTHER

## 2022-09-12 RX ORDER — SUMATRIPTAN 100 MG/1
TABLET, FILM COATED ORAL
Qty: 9 TABLET | Refills: 3 | Status: SHIPPED | OUTPATIENT
Start: 2022-09-12 | End: 2022-10-17 | Stop reason: SDUPTHER

## 2022-09-12 NOTE — TELEPHONE ENCOUNTER
Meds pending..    Noted on gabapentin prescription that he needs an OFFICE visit for refills. He hasn't been seen in office since 2020.    OKAY FOR HUB TO RELAY MESSAGE

## 2022-09-12 NOTE — TELEPHONE ENCOUNTER
Caller: Hussein Manning EVER    Relationship: Self    Best call back number: [phone]    Requested Prescriptions:   Requested Prescriptions     Pending Prescriptions Disp Refills   • SUMAtriptan (IMITREX) 100 MG tablet [Pharmacy Med Name: SUMAtriptan SUCCINATE 100 M 100 Tablet] 9 tablet 3     Sig: TAKE 1 TABLET BY MOUTH AT ONSET OF HEADACHE MAY REPEAT ONCE IN 2 HOURS (NOT OVER 2 IN 24 HOURS)   • gabapentin (NEURONTIN) 800 MG tablet [Pharmacy Med Name: GABAPENTIN 800 MG TABS 800 Tablet] 90 tablet 3     Sig: TAKE ONE TABLET BY MOUTH THREE TIMES A DAY (EVERY 8 HOURS)        Pharmacy where request should be sent: [facility] [address] [phone] [facility] [phone]      Additional details provided by patient: COMPLETELY OUT OF MEDICATION    Does the patient have less than a 3 day supply:  [x] Yes  [] No    Brett Ho Rep   09/12/22 12:28 EDT

## 2022-10-10 DIAGNOSIS — G62.9 NEUROPATHY: ICD-10-CM

## 2022-10-10 RX ORDER — GABAPENTIN 800 MG/1
800 TABLET ORAL 3 TIMES DAILY
Qty: 90 TABLET | Refills: 0 | OUTPATIENT
Start: 2022-10-10

## 2022-10-10 NOTE — TELEPHONE ENCOUNTER
Caller: Hussein Manning    Relationship: Self    Best call back number: [phone]    Requested Prescriptions:   Requested Prescriptions     Pending Prescriptions Disp Refills   • gabapentin (NEURONTIN) 800 MG tablet 90 tablet 0     Sig: Take 1 tablet by mouth 3 (Three) Times a Day. MUST HAVE OFFICE VISIT FOR REFILLS        Pharmacy where request should be sent:      [facility] [address] [phone] [facility] [phone] FX        Additional details provided by patient:    Does the patient have less than a 3 day supply:  [x] Yes  [] No    Brett Herrera Rep   10/10/22 10:32 EDT

## 2022-10-11 ENCOUNTER — TELEPHONE (OUTPATIENT)
Dept: FAMILY MEDICINE CLINIC | Facility: CLINIC | Age: 47
End: 2022-10-11

## 2022-10-11 NOTE — TELEPHONE ENCOUNTER
Caller: Hussein Manning    Relationship to patient: Self    Best call back number: [phone]    Patient is needing: PLEASE CHANGE IN OFFICE VISIT TO VIRTUAL VISIT- PATIENT LIVES OVER AN HOUR AWAY (ONE HOUR AND 40 MINUTES) AND MUST VIRTUAL VISIT- PLEASE CALL TO RESCHEDULE      UNABLE TO WARM TRANSFER

## 2022-10-17 ENCOUNTER — OFFICE VISIT (OUTPATIENT)
Dept: FAMILY MEDICINE CLINIC | Facility: CLINIC | Age: 47
End: 2022-10-17

## 2022-10-17 VITALS
OXYGEN SATURATION: 96 % | SYSTOLIC BLOOD PRESSURE: 118 MMHG | HEART RATE: 65 BPM | TEMPERATURE: 97 F | BODY MASS INDEX: 21.79 KG/M2 | WEIGHT: 164.4 LBS | RESPIRATION RATE: 14 BRPM | HEIGHT: 73 IN | DIASTOLIC BLOOD PRESSURE: 86 MMHG

## 2022-10-17 DIAGNOSIS — G89.29 CHRONIC ANKLE PAIN, BILATERAL: ICD-10-CM

## 2022-10-17 DIAGNOSIS — Z12.5 SPECIAL SCREENING FOR MALIGNANT NEOPLASM OF PROSTATE: ICD-10-CM

## 2022-10-17 DIAGNOSIS — G62.9 NEUROPATHY: ICD-10-CM

## 2022-10-17 DIAGNOSIS — G43.109 MIGRAINE WITH AURA AND WITHOUT STATUS MIGRAINOSUS, NOT INTRACTABLE: ICD-10-CM

## 2022-10-17 DIAGNOSIS — M25.571 CHRONIC ANKLE PAIN, BILATERAL: ICD-10-CM

## 2022-10-17 DIAGNOSIS — K21.9 GASTROESOPHAGEAL REFLUX DISEASE WITHOUT ESOPHAGITIS: ICD-10-CM

## 2022-10-17 DIAGNOSIS — Z00.00 ROUTINE GENERAL MEDICAL EXAMINATION AT A HEALTH CARE FACILITY: Primary | ICD-10-CM

## 2022-10-17 DIAGNOSIS — M25.572 CHRONIC ANKLE PAIN, BILATERAL: ICD-10-CM

## 2022-10-17 DIAGNOSIS — Z51.81 ENCOUNTER FOR THERAPEUTIC DRUG MONITORING: ICD-10-CM

## 2022-10-17 DIAGNOSIS — Z12.11 SCREEN FOR COLON CANCER: ICD-10-CM

## 2022-10-17 DIAGNOSIS — J30.9 ALLERGIC RHINITIS, UNSPECIFIED SEASONALITY, UNSPECIFIED TRIGGER: ICD-10-CM

## 2022-10-17 PROCEDURE — 99396 PREV VISIT EST AGE 40-64: CPT | Performed by: PHYSICIAN ASSISTANT

## 2022-10-17 RX ORDER — BUPROPION HYDROCHLORIDE 300 MG/1
TABLET ORAL
COMMUNITY
End: 2022-10-17

## 2022-10-17 RX ORDER — BUPRENORPHINE HYDROCHLORIDE, NALOXONE HYDROCHLORIDE 8; 2 MG/1; MG/1
FILM, SOLUBLE BUCCAL; SUBLINGUAL
COMMUNITY
Start: 2022-10-07

## 2022-10-17 RX ORDER — OMEPRAZOLE 20 MG/1
20 CAPSULE, DELAYED RELEASE ORAL DAILY
Qty: 30 CAPSULE | Refills: 5 | Status: SHIPPED | OUTPATIENT
Start: 2022-10-17

## 2022-10-17 RX ORDER — ERGOCALCIFEROL 1.25 MG/1
50000 CAPSULE ORAL WEEKLY
Qty: 5 CAPSULE | Refills: 3 | Status: SHIPPED | OUTPATIENT
Start: 2022-10-17 | End: 2023-03-14

## 2022-10-17 RX ORDER — ALBUTEROL SULFATE 90 UG/1
AEROSOL, METERED RESPIRATORY (INHALATION)
COMMUNITY

## 2022-10-17 RX ORDER — GABAPENTIN 800 MG/1
800 TABLET ORAL 3 TIMES DAILY
Qty: 90 TABLET | Refills: 5 | Status: SHIPPED | OUTPATIENT
Start: 2022-10-17

## 2022-10-17 RX ORDER — CETIRIZINE HYDROCHLORIDE 10 MG/1
10 TABLET ORAL DAILY
Qty: 30 TABLET | Refills: 11 | Status: SHIPPED | OUTPATIENT
Start: 2022-10-17

## 2022-10-17 RX ORDER — GABAPENTIN 800 MG/1
800 TABLET ORAL 3 TIMES DAILY
Qty: 90 TABLET | Refills: 5 | Status: SHIPPED | OUTPATIENT
Start: 2022-10-17 | End: 2022-10-17 | Stop reason: SDUPTHER

## 2022-10-17 RX ORDER — NALOXONE HYDROCHLORIDE 4 MG/.1ML
SPRAY NASAL
COMMUNITY
Start: 2022-07-15

## 2022-10-17 RX ORDER — SUMATRIPTAN 100 MG/1
TABLET, FILM COATED ORAL
Qty: 9 TABLET | Refills: 3 | Status: SHIPPED | OUTPATIENT
Start: 2022-10-17

## 2022-10-17 RX ORDER — FLUTICASONE PROPIONATE 50 MCG
SPRAY, SUSPENSION (ML) NASAL
COMMUNITY
Start: 2022-10-07

## 2022-10-17 NOTE — PROGRESS NOTES
Subjective   Hussein Manning is a 47 y.o. male  Ankle Pain, Neuropathy, and Annual Exam  Migraines, allergies, GERD      History of Present Illness  Patient is a pleasant 47-year-old white male who comes in for preventive medical examination he has chronic bilateral ankle pain with neuropathy was involved in a fall in 2013, he had chronic pain since that time  xray done 3/15134Ubaay views of the right ankle and 3 views of the left ankle  reveal hardware seen in both ankles with screw identified in the  calcaneus bilaterally. There is extensive hardware identified within the  right ankle and distal tibia and fibula with degenerative changes seen  within the ankle mortise.  Patient gabapentin 800 mg 3 times a day meds working okay would like to see pain management also has chronic issues neuropathy migraines allergies and GERD all chronic conditions stable      The following portions of the patient's history were reviewed and updated as appropriate: allergies, current medications, past social history and problem list    Review of Systems   Constitutional: Negative for fatigue and unexpected weight change.   Respiratory: Negative for cough, chest tightness and shortness of breath.    Cardiovascular: Negative for chest pain, palpitations and leg swelling.   Gastrointestinal: Negative for nausea.   Musculoskeletal: Positive for arthralgias and back pain.        Ankle pain   Skin: Negative for color change and rash.   Neurological: Negative for dizziness, syncope, weakness and headaches.       Objective     Vitals:    10/17/22 1556   BP: 118/86   Pulse: 65   Resp: 14   Temp: 97 °F (36.1 °C)   SpO2: 96%       Physical Exam  Vitals and nursing note reviewed.   Constitutional:       General: He is not in acute distress.     Appearance: Normal appearance. He is well-developed. He is not ill-appearing, toxic-appearing or diaphoretic.   Neck:      Vascular: No carotid bruit or JVD.   Cardiovascular:      Rate and Rhythm: Normal  rate and regular rhythm.      Pulses: Normal pulses.      Heart sounds: Normal heart sounds. No murmur heard.  Pulmonary:      Effort: Pulmonary effort is normal. No respiratory distress.      Breath sounds: Normal breath sounds.   Abdominal:      Palpations: Abdomen is soft.      Tenderness: There is no abdominal tenderness.   Musculoskeletal:      Left ankle: Tenderness present. Decreased range of motion.   Skin:     General: Skin is warm and dry.   Neurological:      Mental Status: He is alert.         Assessment & Plan     Diagnoses and all orders for this visit:    1. Routine general medical examination at a health care facility (Primary)  -     Comprehensive Metabolic Panel; Future  -     CBC & Differential; Future  -     Lipid Panel; Future  -     TSH; Future    2. Special screening for malignant neoplasm of prostate  -     PSA Screen; Future    3. Neuropathy  -     gabapentin (NEURONTIN) 800 MG tablet; Take 1 tablet by mouth 3 (Three) Times a Day. MUST HAVE OFFICE VISIT FOR REFILLS  Dispense: 90 tablet; Refill: 5    4. Allergic rhinitis, unspecified seasonality, unspecified trigger  -     cetirizine (zyrTEC) 10 MG tablet; Take 1 tablet by mouth Daily.  Dispense: 30 tablet; Refill: 11    5. Gastroesophageal reflux disease without esophagitis  -     omeprazole (priLOSEC) 20 MG capsule; Take 1 capsule by mouth Daily.  Dispense: 30 capsule; Refill: 5    6. Migraine with aura and without status migrainosus, not intractable  -     SUMAtriptan (IMITREX) 100 MG tablet; TAKE 1 TABLET BY MOUTH AT ONSET OF HEADACHE MAY REPEAT ONCE IN 2 HOURS (NOT OVER 2 IN 24 HOURS)  Dispense: 9 tablet; Refill: 3    7. Screen for colon cancer  -     Cologuard - Stool, Per Rectum; Future    8. Encounter for therapeutic drug monitoring  -     Compliance Drug Analysis, Ur - Urine, Clean Catch; Future    Other orders  -     vitamin D (ERGOCALCIFEROL) 1.25 MG (67209 UT) capsule capsule; Take 1 capsule by mouth 1 (One) Time Per Week.   Dispense: 5 capsule; Refill: 3     As part of this patient's treatment plan, patient will be prescribed controlled substances. The patient has been made aware of appropriate use of such medications, including potential risk of somnolence, limited ability to drive and /or work safely, and potential for dependence or overdose. It has also been made clear that these medications are for use by this patient only, without concomitant use of alcohol or other substances unless prescribed.Controlled substance status of medication discussed with patient, discussed risks of medication including abuse potential and diversion potential and need to follow up for reevaluation appointment in order to receive further refills.      Preventive medicine discussed, diet, exercise, healthy living discussed at length.  Discussed nutrition, physical activity, healthy weight, injury prevention, misuse of tobacco, alcohol and drugs, dental health, mental health, immunizations, screening      Part of this note may be an electronic transcription/translation of spoken language to printed text using the Dragon Dictation System.

## 2022-11-03 ENCOUNTER — TELEPHONE (OUTPATIENT)
Dept: FAMILY MEDICINE CLINIC | Facility: CLINIC | Age: 47
End: 2022-11-03

## 2022-11-03 RX ORDER — AMOXICILLIN 500 MG/1
500 TABLET, FILM COATED ORAL 3 TIMES DAILY
Qty: 12 TABLET | Refills: 0 | Status: SHIPPED | OUTPATIENT
Start: 2022-11-03

## 2022-11-03 NOTE — TELEPHONE ENCOUNTER
Caller: JOLENE FROM [facility]    Relationship:     Best call back number: [phone]    What form or medical record are you requesting: SIGNED ORDERS    Who is requesting this form or medical record from you: [facility]    How would you like to receive the form or medical records (pick-up, mail, fax): FAX  If fax, what is the fax number: [phone]      Timeframe paperwork needed: ASAP    Additional notes: JOLENE NEEDS THE ORDERS SENT AGAIN AS THE COPY THEY RECEIVED WAS NOT SIGNED.

## 2022-11-03 NOTE — TELEPHONE ENCOUNTER
Caller: Hussein Manning    Relationship: Self    Best call back number:  [phone]    What medication are you requesting:     ANTIBIOTICS FOR ABSCESSED TOOTH      If a prescription is needed, what is your preferred pharmacy and phone number: [facility] [address] - [phone] [facility] [phone]      Additional notes:     DENTIST IS UNABLE TO GET HIM IN THE OFFICE FOR ANOTHER MONTH

## 2022-11-17 ENCOUNTER — TELEPHONE (OUTPATIENT)
Dept: FAMILY MEDICINE CLINIC | Facility: CLINIC | Age: 47
End: 2022-11-17

## 2022-11-17 NOTE — TELEPHONE ENCOUNTER
Caller: Hussein Manning     Relationship: [unfilled]     Best call back number: [phone]    What is your medical concern? STATED THAT GIRLFRIEND HAD TAKEN URINE TEST AND WAS TOLD THAT SHE HAS UTI ECOLI. PT WILL LIKE TO KNOW IF THERE IS ANY WAY OF HIM GETTING IT TOO AND IF SO IF MEDICATION CAN BE SEN TO HIS PHARMACY.      [facility] [address] [phone] [facility] [phone] FX

## 2023-03-14 RX ORDER — ERGOCALCIFEROL 1.25 MG/1
CAPSULE ORAL
Qty: 4 CAPSULE | Refills: 3 | Status: SHIPPED | OUTPATIENT
Start: 2023-03-14

## 2023-03-20 ENCOUNTER — TELEPHONE (OUTPATIENT)
Dept: FAMILY MEDICINE CLINIC | Facility: CLINIC | Age: 48
End: 2023-03-20
Payer: MEDICAID

## 2023-03-20 NOTE — TELEPHONE ENCOUNTER
PATIENT STATED THAT HE WENT OUT OF STATE A FEW WEEKS AGO AND DEVELOPED A RASH. WENT TO [facility] THERE AND WAS PUT ON PREDNISONE FOR A WEEK. HE FINISHED THE MEDICATION ABOUT 4 DAYS AGO AND SAID THAT NOW EVERY TIME HE TAKES HIS OTHER MEDICATION HE GETS A RASH ON HIS GROIN, CHEST AND WRIST. WANTED TO KNOW IF HE CAN BE WORKED IN FOR A VIDEO CALL TODAY.     [phone]

## 2023-04-03 ENCOUNTER — TELEPHONE (OUTPATIENT)
Dept: FAMILY MEDICINE CLINIC | Facility: CLINIC | Age: 48
End: 2023-04-03
Payer: MEDICAID

## 2023-04-03 RX ORDER — AMOXICILLIN 500 MG/1
500 TABLET, FILM COATED ORAL 3 TIMES DAILY
Qty: 30 TABLET | Refills: 0 | Status: SHIPPED | OUTPATIENT
Start: 2023-04-03

## 2023-04-03 NOTE — TELEPHONE ENCOUNTER
Caller: Hussein Manning    Relationship: Self    Best call back number: [phone]    What medication are you requesting: ANTIBIOTIC OR     What are your current symptoms: RED AND INFLAMED GUMS AFTER DENTAL PROCEDURE    How long have you been experiencing symptoms: Friday 03.31.23    Have you had these symptoms before:    [] Yes  [x] No    Have you been treated for these symptoms before:   [] Yes  [x] No    If a prescription is needed, what is your preferred pharmacy and phone number: [address] [phone] [facility] [phone]      Additional notes:PATIENT WENT TO A DENTIST IN [place]. HE HAD 10 TEMPORARY CROWNS DONE (8 ON TOP AND 2 ON THE BOTTOM) AND FITTED FOR THE PREMINENT CROWNS. FOR THIS THEY GO UNDER THE PATIENT'S GUMS. HE WILL GET THIS DONE IN ABOUT A MONTH.    HE SAID THERE IS SLIGHT PAIN, WHICH HE EXPECTED. PATIENT WAS NOT GIVEN ANTIOBIOTICS FOR DENTAL BECAUSE INSURANCE WOULD NOT COVER IT IN [place].   HE IS WORRIED ABOUT INFLAMATION AND INFECTION. HE SAYS HIS GUMS ARE RED. PLEASE ADVISE.

## 2023-04-18 ENCOUNTER — TELEMEDICINE (OUTPATIENT)
Dept: FAMILY MEDICINE CLINIC | Facility: CLINIC | Age: 48
End: 2023-04-18
Payer: MEDICAID

## 2023-04-18 DIAGNOSIS — G43.109 MIGRAINE WITH AURA AND WITHOUT STATUS MIGRAINOSUS, NOT INTRACTABLE: ICD-10-CM

## 2023-04-18 DIAGNOSIS — G62.9 NEUROPATHY: Primary | ICD-10-CM

## 2023-04-18 PROCEDURE — 99213 OFFICE O/P EST LOW 20 MIN: CPT | Performed by: PHYSICIAN ASSISTANT

## 2023-04-18 RX ORDER — SUMATRIPTAN 100 MG/1
TABLET, FILM COATED ORAL
Qty: 9 TABLET | Refills: 3 | Status: SHIPPED | OUTPATIENT
Start: 2023-04-18 | End: 2023-04-18 | Stop reason: SDUPTHER

## 2023-04-18 RX ORDER — GABAPENTIN 800 MG/1
800 TABLET ORAL 3 TIMES DAILY
Qty: 90 TABLET | Refills: 2 | Status: SHIPPED | OUTPATIENT
Start: 2023-04-18

## 2023-04-18 RX ORDER — SUMATRIPTAN 100 MG/1
TABLET, FILM COATED ORAL
Qty: 9 TABLET | Refills: 3 | Status: SHIPPED | OUTPATIENT
Start: 2023-04-18

## 2023-04-18 NOTE — PROGRESS NOTES
Subjective   Hussein Manning is a 47 y.o. male  Ankle Injury (Chronic ankle pain)  Chronic neuropathy migraine headaches  Patient presents and consents for telehealth/video visit examination.  History of Present Illness  Patient is a pleasant 47-year-old white male who comes in for follow-up of neuropathy patient has chronic ankle pain and chronic neuropathy does has a history of opioid abuse and is seeing and is being seen in Sub[facility]one clinic and goes to monthly appointments for rehab.  Has chronic ankle injury (see chart) patient also has recurrent migraine headaches.  Needs refill on migraine medication      The following portions of the patient's history were reviewed and updated as appropriate: allergies, current medications, past social history and problem list    Review of Systems   Constitutional: Negative for fatigue and unexpected weight change.   Respiratory: Negative for cough, chest tightness and shortness of breath.    Cardiovascular: Negative for chest pain, palpitations and leg swelling.   Gastrointestinal: Negative for nausea.   Skin: Negative for color change and rash.   Neurological: Negative for dizziness, syncope, weakness and headaches.       Objective     There were no vitals filed for this visit.    Physical Exam  Constitutional:       Appearance: Normal appearance. He is normal weight.   Neurological:      Mental Status: He is alert.   Psychiatric:         Mood and Affect: Mood normal.         Behavior: Behavior normal.         Thought Content: Thought content normal.         Judgment: Judgment normal.         Assessment & Plan     Diagnoses and all orders for this visit:    1. Neuropathy (Primary)  -     gabapentin (NEURONTIN) 800 MG tablet; Take 1 tablet by mouth 3 (Three) Times a Day.  Dispense: 90 tablet; Refill: 2    2. Migraine with aura and without status migrainosus, not intractable  -     Discontinue: SUMAtriptan (IMITREX) 100 MG tablet; TAKE 1 TABLET BY MOUTH AT ONSET OF HEADACHE MAY  REPEAT ONCE IN 2 HOURS (NOT OVER 2 IN 24 HOURS)  Dispense: 9 tablet; Refill: 3  -     SUMAtriptan (IMITREX) 100 MG tablet; TAKE 1 TABLET BY MOUTH AT ONSET OF HEADACHE MAY REPEAT ONCE IN 2 HOURS (NOT OVER 2 IN 24 HOURS)  Dispense: 9 tablet; Refill: 3      Discussed with patient that he is come in the office for next visit we will refill medication for 3 months at that point he will need to come into the office    As part of this patient's treatment plan, patient will be prescribed controlled substances. The patient has been made aware of appropriate use of such medications, including potential risk of somnolence, limited ability to drive and /or work safely, and potential for dependence or overdose. It has also been made clear that these medications are for use by this patient only, without concomitant use of alcohol or other substances unless prescribed.Controlled substance status of medication discussed with patient, discussed risks of medication including abuse potential and diversion potential and need to follow up for reevaluation appointment in order to receive further refills.    Part of this note may be an electronic transcription/translation of spoken language to printed text using the Dragon Dictation System.    I spent 15 minutes in patient care: Reviewing records prior to the visit, examining the patient, entering orders and documentation    Part of this note may be an electronic transcription/translation of spoken language to printed text using the Dragon Dictation System.

## 2023-10-05 RX ORDER — ERGOCALCIFEROL 1.25 MG/1
CAPSULE ORAL
Qty: 4 CAPSULE | Refills: 3 | Status: SHIPPED | OUTPATIENT
Start: 2023-10-05

## 2023-11-02 DIAGNOSIS — J30.9 ALLERGIC RHINITIS, UNSPECIFIED SEASONALITY, UNSPECIFIED TRIGGER: ICD-10-CM

## 2023-11-02 RX ORDER — CETIRIZINE HYDROCHLORIDE 10 MG/1
10 TABLET ORAL DAILY
Qty: 30 TABLET | Refills: 11 | Status: SHIPPED | OUTPATIENT
Start: 2023-11-02

## 2023-12-05 ENCOUNTER — TELEPHONE (OUTPATIENT)
Dept: FAMILY MEDICINE CLINIC | Facility: CLINIC | Age: 48
End: 2023-12-05
Payer: MEDICAID

## 2023-12-05 NOTE — TELEPHONE ENCOUNTER
Caller: Hussein Manning    Relationship: Self    Best call back number: [phone]    What is the best time to reach you: ANYTIME    Who are you requesting to speak with (clinical staff, provider,  specific staff member): PROVIDER    What was the call regarding: PATIENT'S TRANSPORTATION IS NOT AVAIL FOR HIS APPT TOMORROW SO PATIENT HAD TO CX APPT. IS THERE SOMEPLACE CLOSE TO THE PATIENT'S HOME WHERE HE CAN GIVE THE URINE SAMPLE? HE SAID MR ASTORGA HAD MENTIONED HE COULD GET IT DONE AT A HOSPITAL NEAR HIM THE LAST TIME THE TWO OF THEM SPOKE.

## 2023-12-08 NOTE — TELEPHONE ENCOUNTER
Patient has an appointment Monday Dec 11. He had to cancel last appt due to transportation and said he will be here Monday and do UDS.

## 2023-12-11 ENCOUNTER — OFFICE VISIT (OUTPATIENT)
Dept: FAMILY MEDICINE CLINIC | Facility: CLINIC | Age: 48
End: 2023-12-11
Payer: MEDICAID

## 2023-12-11 ENCOUNTER — LAB (OUTPATIENT)
Dept: FAMILY MEDICINE CLINIC | Facility: CLINIC | Age: 48
End: 2023-12-11
Payer: MEDICAID

## 2023-12-11 VITALS
TEMPERATURE: 97.6 F | OXYGEN SATURATION: 99 % | HEART RATE: 71 BPM | RESPIRATION RATE: 16 BRPM | BODY MASS INDEX: 21.55 KG/M2 | WEIGHT: 162.6 LBS | DIASTOLIC BLOOD PRESSURE: 86 MMHG | SYSTOLIC BLOOD PRESSURE: 120 MMHG | HEIGHT: 73 IN

## 2023-12-11 DIAGNOSIS — M25.571 CHRONIC PAIN OF RIGHT ANKLE: ICD-10-CM

## 2023-12-11 DIAGNOSIS — Z00.00 ROUTINE GENERAL MEDICAL EXAMINATION AT A HEALTH CARE FACILITY: ICD-10-CM

## 2023-12-11 DIAGNOSIS — Z00.00 ROUTINE GENERAL MEDICAL EXAMINATION AT A HEALTH CARE FACILITY: Primary | ICD-10-CM

## 2023-12-11 DIAGNOSIS — G89.29 CHRONIC PAIN OF RIGHT ANKLE: ICD-10-CM

## 2023-12-11 DIAGNOSIS — Z51.81 ENCOUNTER FOR THERAPEUTIC DRUG MONITORING: ICD-10-CM

## 2023-12-11 DIAGNOSIS — G62.9 NEUROPATHY: Primary | ICD-10-CM

## 2023-12-11 DIAGNOSIS — Z12.5 SPECIAL SCREENING FOR MALIGNANT NEOPLASM OF PROSTATE: ICD-10-CM

## 2023-12-11 PROCEDURE — 36415 COLL VENOUS BLD VENIPUNCTURE: CPT | Performed by: PHYSICIAN ASSISTANT

## 2023-12-11 RX ORDER — GABAPENTIN 800 MG/1
800 TABLET ORAL 3 TIMES DAILY
Qty: 90 TABLET | Refills: 5 | Status: SHIPPED | OUTPATIENT
Start: 2023-12-11

## 2023-12-11 NOTE — PROGRESS NOTES
Subjective   Hussein EVER Manning is a 48 y.o. male  Ankle Pain (F/U on chronic Rt ankle pain. RF gabapentin), Neuropathy (F/U), and Annual Exam      History of Present Illness  Patient is a pleasant 48-year-old white male who comes in for preventive medical examination patient also has chronic right ankle pain secondary to crush injury he has chronic neuropathy needs refill on gabapentin  The following portions of the patient's history were reviewed and updated as appropriate: allergies, current medications, past social history and problem list    Review of Systems   Constitutional: Negative.  Negative for fatigue and unexpected weight change.   HENT: Negative.     Eyes: Negative.    Respiratory: Negative.  Negative for cough, chest tightness and shortness of breath.    Cardiovascular: Negative.  Negative for chest pain, palpitations and leg swelling.   Gastrointestinal: Negative.  Negative for nausea.   Endocrine: Negative.    Genitourinary: Negative.    Musculoskeletal: Negative.    Skin: Negative.  Negative for color change and rash.   Allergic/Immunologic: Negative.    Neurological: Negative.  Negative for dizziness, syncope, weakness and headaches.   Hematological: Negative.    Psychiatric/Behavioral: Negative.     All other systems reviewed and are negative.      Objective     Vitals:    12/11/23 1259   BP: 120/86   Pulse: 71   Resp: 16   Temp: 97.6 °F (36.4 °C)   SpO2: 99%       Physical Exam  Vitals and nursing note reviewed.   Constitutional:       General: He is not in acute distress.     Appearance: Normal appearance. He is well-developed. He is not ill-appearing, toxic-appearing or diaphoretic.   Neck:      Vascular: No carotid bruit or JVD.   Cardiovascular:      Rate and Rhythm: Normal rate and regular rhythm.      Pulses: Normal pulses.      Heart sounds: Normal heart sounds. No murmur heard.  Pulmonary:      Effort: Pulmonary effort is normal. No respiratory distress.      Breath sounds: Normal breath  sounds.   Abdominal:      Palpations: Abdomen is soft.      Tenderness: There is no abdominal tenderness.   Skin:     General: Skin is warm and dry.   Neurological:      Mental Status: He is alert.         Assessment & Plan     Diagnoses and all orders for this visit:    1. Neuropathy (Primary)  -     gabapentin (NEURONTIN) 800 MG tablet; Take 1 tablet by mouth 3 (Three) Times a Day.  Dispense: 90 tablet; Refill: 5    2. Routine general medical examination at a health care facility  -     Comprehensive Metabolic Panel; Future  -     Lipid Panel; Future  -     TSH; Future  -     CBC (No Diff); Future  -     CBC (No Diff)  -     TSH  -     Lipid Panel  -     Comprehensive Metabolic Panel    3. Special screening for malignant neoplasm of prostate  -     PSA Screen; Future  -     PSA Screen    4. Chronic pain of right ankle  -     gabapentin (NEURONTIN) 800 MG tablet; Take 1 tablet by mouth 3 (Three) Times a Day.  Dispense: 90 tablet; Refill: 5    5. Encounter for therapeutic drug monitoring  -     Cancel: Compliance Drug Analysis, Ur - Urine, Clean Catch; Future  -     Compliance Drug Analysis, Ur - Urine, Clean Catch; Future     Preventive medicine discussed, diet, exercise, healthy living discussed at length.  Discussed nutrition, physical activity, healthy weight, injury prevention, misuse of tobacco, alcohol and drugs, dental health, mental health, immunizations, screening    Part of this note may be an electronic transcription/translation of spoken language to printed text using the Dragon Dictation System.

## 2023-12-13 LAB
ALBUMIN SERPL-MCNC: NORMAL G/DL
ALP SERPL-CCNC: NORMAL U/L
ALT SERPL-CCNC: NORMAL U/L
AST SERPL-CCNC: NORMAL U/L
BILIRUB SERPL-MCNC: NORMAL MG/DL
BUN SERPL-MCNC: NORMAL MG/DL
CALCIUM SERPL-MCNC: NORMAL MG/DL
CHLORIDE SERPL-SCNC: NORMAL MMOL/L
CHOLEST SERPL-MCNC: 145 MG/DL (ref 0–200)
CO2 SERPL-SCNC: NORMAL MMOL/L
CREAT SERPL-MCNC: NORMAL MG/DL
ERYTHROCYTE [DISTWIDTH] IN BLOOD BY AUTOMATED COUNT: 12.4 % (ref 12.3–15.4)
GLUCOSE SERPL-MCNC: NORMAL MG/DL
HCT VFR BLD AUTO: 40.4 % (ref 37.5–51)
HDLC SERPL-MCNC: 52 MG/DL (ref 40–60)
HGB BLD-MCNC: 13.3 G/DL (ref 13–17.7)
LDLC SERPL CALC-MCNC: 81 MG/DL (ref 0–100)
MCH RBC QN AUTO: 29.3 PG (ref 26.6–33)
MCHC RBC AUTO-ENTMCNC: 32.9 G/DL (ref 31.5–35.7)
MCV RBC AUTO: 89 FL (ref 79–97)
PLATELET # BLD AUTO: 233 10*3/MM3 (ref 140–450)
POTASSIUM SERPL-SCNC: NORMAL MMOL/L
PROT SERPL-MCNC: NORMAL G/DL
PSA SERPL-MCNC: 0.42 NG/ML (ref 0–4)
RBC # BLD AUTO: 4.54 10*6/MM3 (ref 4.14–5.8)
SODIUM SERPL-SCNC: NORMAL MMOL/L
SPECIMEN STATUS: NORMAL
TRIGL SERPL-MCNC: 60 MG/DL (ref 0–150)
TSH SERPL DL<=0.005 MIU/L-ACNC: 2.02 UIU/ML (ref 0.27–4.2)
VLDLC SERPL CALC-MCNC: 12 MG/DL (ref 5–40)
WBC # BLD AUTO: 4.55 10*3/MM3 (ref 3.4–10.8)

## 2024-02-12 RX ORDER — ERGOCALCIFEROL 1.25 MG/1
CAPSULE ORAL
Qty: 4 CAPSULE | Refills: 3 | Status: SHIPPED | OUTPATIENT
Start: 2024-02-12

## 2024-03-08 DIAGNOSIS — G43.109 MIGRAINE WITH AURA AND WITHOUT STATUS MIGRAINOSUS, NOT INTRACTABLE: ICD-10-CM

## 2024-03-08 RX ORDER — SUMATRIPTAN 100 MG/1
TABLET, FILM COATED ORAL
Qty: 9 TABLET | Refills: 3 | Status: SHIPPED | OUTPATIENT
Start: 2024-03-08

## 2024-03-11 ENCOUNTER — TELEMEDICINE (OUTPATIENT)
Dept: FAMILY MEDICINE CLINIC | Facility: CLINIC | Age: 49
End: 2024-03-11
Payer: MEDICAID

## 2024-03-11 DIAGNOSIS — M25.571 CHRONIC ANKLE PAIN, BILATERAL: Primary | ICD-10-CM

## 2024-03-11 DIAGNOSIS — M25.572 CHRONIC ANKLE PAIN, BILATERAL: Primary | ICD-10-CM

## 2024-03-11 DIAGNOSIS — G89.29 CHRONIC ANKLE PAIN, BILATERAL: Primary | ICD-10-CM

## 2024-03-11 PROCEDURE — 99213 OFFICE O/P EST LOW 20 MIN: CPT | Performed by: PHYSICIAN ASSISTANT

## 2024-03-11 NOTE — PROGRESS NOTES
Subjective   Hussein Manning is a 48 y.o. male  Bilateral foot and ankle pain    Patient presents and consents for telehealth/video visit examination.  I am office patient's at home address  History of Present Illness  Patient is a pleasant 48-year-old male who comes in complaint of bilateral right and left heel pain right greater than left he has right ankle show severe degenerative right ankle pain with previous x-rays with hardware in the distal fibula and tibia patient would like referral to Ortho.  Patient takes gabapentin for pain  The following portions of the patient's history were reviewed and updated as appropriate: allergies, current medications, past social history and problem list    Review of Systems   Constitutional:  Negative for fatigue and unexpected weight change.   Respiratory:  Negative for cough, chest tightness and shortness of breath.    Cardiovascular:  Negative for chest pain, palpitations and leg swelling.   Gastrointestinal:  Negative for nausea.   Skin:  Negative for color change and rash.   Neurological:  Negative for dizziness, syncope, weakness and headaches.       Objective     There were no vitals filed for this visit.    Physical Exam  Constitutional:       Appearance: Normal appearance. He is normal weight.   Neurological:      Mental Status: He is alert.   Psychiatric:         Mood and Affect: Mood normal.         Behavior: Behavior normal.         Thought Content: Thought content normal.         Judgment: Judgment normal.         Assessment & Plan     Diagnoses and all orders for this visit:    1. Chronic ankle pain, bilateral (Primary)  -     Ambulatory Referral to Orthopedic Surgery     I spent 15 minutes in patient care: Reviewing records prior to the visit, examining the patient, entering orders and documentation    Part of this note may be an electronic transcription/translation of spoken language to printed text using the Dragon Dictation System.

## 2024-03-26 ENCOUNTER — TELEPHONE (OUTPATIENT)
Dept: FAMILY MEDICINE CLINIC | Facility: CLINIC | Age: 49
End: 2024-03-26
Payer: MEDICAID

## 2024-03-26 NOTE — TELEPHONE ENCOUNTER
Caller: Hussein Manning    Relationship: Self    Best call back number: [phone]    What medication are you requesting: SOMETHING FOR RASH    What are your current symptoms: RASH ALL OVER AND NOW HARD BUMPS, ITCHING VERY BAD    How long have you been experiencing symptoms: 2 DAYS    Have you had these symptoms before:    [] Yes  [x] No    Have you been treated for these symptoms before:   [] Yes  [x] No    If a prescription is needed, what is your preferred pharmacy and phone number:    [facility] [phone]  Additional notes:  PATIENT RECENTLY GOT A DOG FROM [facility] AND GAVE HIM A BATH 2 DAYS AGO AND WOKE UP THE NEXT MORNING WITH RASH UNDER LEFT ARMPIT, FOLDS ON BACK OF KNEES, UPPER BUTTOCKS, ON ARMS AND INNER THIGHS WAS ITCHING REALLY BAD AND NOW THERE ARE HARD BUMPS THAT MAY HAVE FLUID IN THEM. PATIENT DID HAVE A SMALL SCRATCH ON WRIST WHEN HE GAVE THE DOG A BATH. PATIENT UNSURE IF THIS HAS ANYTHING TO DO WITH THE RASH. PATIENT ALSO JUST STOPPED TAKING COLLAGEN WHEN HE NOTICED THE RASH. PATIENT HAD BEEN TAKING COLLAGEN FOR ABOUT 10 DAYS PRIOR. PLEASE ADVISE

## 2024-03-28 ENCOUNTER — TELEPHONE (OUTPATIENT)
Dept: FAMILY MEDICINE CLINIC | Facility: CLINIC | Age: 49
End: 2024-03-28

## 2024-03-28 RX ORDER — METHYLPREDNISOLONE 4 MG/1
TABLET ORAL
Qty: 21 TABLET | Refills: 0 | Status: SHIPPED | OUTPATIENT
Start: 2024-03-28

## 2024-03-28 NOTE — TELEPHONE ENCOUNTER
Patient had similar rash in the past (dx'd with poison oak) and was prescribed prednisone which helped. Note in chart.    He has been taking Benadryl at night which helps the itching temporarily.    He is going to upload pictures to [facility] as well.

## 2024-03-28 NOTE — TELEPHONE ENCOUNTER
PATIENT CALLED TO REPORT THAT HE HAS A RASH ALL OVER HIS BODY, WOKE UP WITH IT ON SATURDAY. HE SAID IT'S LIKE LITTLE BLISTERS INSIDE BLISTERS. SAID HE WENT TO THE ER ON TUESDAY AND WAS TOLD THAT IT WAS SCABIES AND GIVEN A CREAM, THAT IS NOT HELPING. STATES THAT HE HAS HAD THEM BEFORE AND ITS NOTHING LIKE WHAT HE HAS.     ALSO SAID THAT THE SAME THING HAPPENED TO HIM LAST YEAR AROUND THE SAME TIME. STATED THAT SOMETHING MAY HAVE TRIGGERED HIS AUTO IMMUNE SYSTEM WHICH CAUSED IT TO OVERWORK AND THAT'S WHAT'S CAUSING THE OUTBREAK. HE DID SOME RESEARCH ON [employer], SOMETHING COULD BE IN THE ENVIRONMENT THAT IS CAUSING THIS, HE DIDN'T GROW UP IN [place] SO THAT IS THE REASON.    STATES THAT IT IS VERY ITCHY. PLEASE ADVISE

## 2024-06-24 ENCOUNTER — TELEMEDICINE (OUTPATIENT)
Dept: FAMILY MEDICINE CLINIC | Facility: CLINIC | Age: 49
End: 2024-06-24
Payer: MEDICAID

## 2024-06-24 DIAGNOSIS — G43.109 MIGRAINE WITH AURA AND WITHOUT STATUS MIGRAINOSUS, NOT INTRACTABLE: ICD-10-CM

## 2024-06-24 DIAGNOSIS — M25.571 CHRONIC PAIN OF RIGHT ANKLE: ICD-10-CM

## 2024-06-24 DIAGNOSIS — G62.9 NEUROPATHY: Primary | ICD-10-CM

## 2024-06-24 DIAGNOSIS — G89.29 CHRONIC PAIN OF RIGHT ANKLE: ICD-10-CM

## 2024-06-24 DIAGNOSIS — M54.40 LOW BACK PAIN WITH SCIATICA, SCIATICA LATERALITY UNSPECIFIED, UNSPECIFIED BACK PAIN LATERALITY, UNSPECIFIED CHRONICITY: ICD-10-CM

## 2024-06-24 DIAGNOSIS — K21.9 GASTROESOPHAGEAL REFLUX DISEASE WITHOUT ESOPHAGITIS: ICD-10-CM

## 2024-06-24 PROCEDURE — 99213 OFFICE O/P EST LOW 20 MIN: CPT | Performed by: PHYSICIAN ASSISTANT

## 2024-06-24 RX ORDER — SUMATRIPTAN 100 MG/1
TABLET, FILM COATED ORAL
Qty: 9 TABLET | Refills: 3 | Status: SHIPPED | OUTPATIENT
Start: 2024-06-24

## 2024-06-24 RX ORDER — GABAPENTIN 800 MG/1
800 TABLET ORAL 3 TIMES DAILY
Qty: 90 TABLET | Refills: 3 | Status: SHIPPED | OUTPATIENT
Start: 2024-06-24

## 2024-06-24 RX ORDER — OMEPRAZOLE 20 MG/1
20 CAPSULE, DELAYED RELEASE ORAL DAILY
Qty: 30 CAPSULE | Refills: 5 | Status: SHIPPED | OUTPATIENT
Start: 2024-06-24

## 2024-06-24 RX ORDER — DICLOFENAC SODIUM 75 MG/1
75 TABLET, DELAYED RELEASE ORAL 2 TIMES DAILY
Qty: 60 TABLET | Refills: 11 | Status: SHIPPED | OUTPATIENT
Start: 2024-06-24

## 2024-06-24 NOTE — PROGRESS NOTES
Subjective   Hussein Manning is a 48 y.o. male  Neuropathy  Migrane  Low Back pain  Patient presents and consents for telehealth/video visit examination. I am my office, pt is at home address    History of Present Illness  History of Present Illness    Patient is a pleasant 48-year-old male who comes in for follow-up of chronic neuropathy in lower extremities, migraine headaches, chronic low back pain needs refill of gabapentin, Imitrex and omeprazole  chronic conditions are stable needs refill of meds  The following portions of the patient's history were reviewed and updated as appropriate: allergies, current medications, past social history and problem list    Review of Systems   Constitutional:  Negative for fatigue and unexpected weight change.   Respiratory:  Negative for cough, chest tightness and shortness of breath.    Cardiovascular:  Negative for chest pain, palpitations and leg swelling.   Gastrointestinal:  Negative for nausea.   Skin:  Negative for color change and rash.   Neurological:  Negative for dizziness, syncope, weakness and headaches.       Objective     There were no vitals filed for this visit.    Physical Exam  Constitutional:       Appearance: Normal appearance. He is normal weight.   Neurological:      Mental Status: He is alert.   Psychiatric:         Mood and Affect: Mood normal.         Behavior: Behavior normal.         Thought Content: Thought content normal.         Judgment: Judgment normal.       Physical Exam      Assessment & Plan   Assessment & Plan      Diagnoses and all orders for this visit:    1. Neuropathy (Primary)    2. Migraine with aura and without status migrainosus, not intractable  -     SUMAtriptan (IMITREX) 100 MG tablet; TAKE 1 TABLET BY MOUTH AT ONSET OF HEADACHE MAY REPEAT ONCE IN 2 HOURS (NOT OVER 2 IN 24 HOURS)  Dispense: 9 tablet; Refill: 3    3. Low back pain with sciatica, sciatica laterality unspecified, unspecified back pain laterality, unspecified  chronicity  -     diclofenac (VOLTAREN) 75 MG EC tablet; Take 1 tablet by mouth 2 (Two) Times a Day.  Dispense: 60 tablet; Refill: 11    4. Gastroesophageal reflux disease without esophagitis  -     omeprazole (priLOSEC) 20 MG capsule; Take 1 capsule by mouth Daily.  Dispense: 30 capsule; Refill: 5    Preventive medicine discussed, diet, exercise, healthy living discussed at length.  Discussed nutrition, physical activity, healthy weight, injury prevention, misuse of tobacco, alcohol and drugs, dental health, mental health, immunizations, screening    Part of this note may be an electronic transcription/translation of spoken language to printed text using the Dragon Dictation System.       I spent 15 minutes in patient care: Reviewing records prior to the visit, examining the patient, entering orders and documentation    Part of this note may be an electronic transcription/translation of spoken language to printed text using the Dragon Dictation System.       Patient or patient representative verbalized consent for the use of Ambient Listening during the visit with  ANNY Wilkerson for chart documentation. 6/24/2024  16:44 EDT

## 2024-10-25 DIAGNOSIS — G62.9 NEUROPATHY: ICD-10-CM

## 2024-10-25 DIAGNOSIS — M25.571 CHRONIC PAIN OF RIGHT ANKLE: ICD-10-CM

## 2024-10-25 DIAGNOSIS — G89.29 CHRONIC PAIN OF RIGHT ANKLE: ICD-10-CM

## 2024-10-28 RX ORDER — GABAPENTIN 800 MG/1
800 TABLET ORAL 3 TIMES DAILY
Qty: 90 TABLET | Refills: 3 | Status: SHIPPED | OUTPATIENT
Start: 2024-10-28

## 2025-02-07 DIAGNOSIS — K21.9 GASTROESOPHAGEAL REFLUX DISEASE WITHOUT ESOPHAGITIS: ICD-10-CM

## 2025-04-30 ENCOUNTER — OFFICE VISIT (OUTPATIENT)
Dept: FAMILY MEDICINE CLINIC | Facility: CLINIC | Age: 50
End: 2025-04-30
Payer: MEDICAID

## 2025-04-30 VITALS
OXYGEN SATURATION: 98 % | DIASTOLIC BLOOD PRESSURE: 88 MMHG | SYSTOLIC BLOOD PRESSURE: 142 MMHG | RESPIRATION RATE: 16 BRPM | BODY MASS INDEX: 25.38 KG/M2 | HEIGHT: 73 IN | WEIGHT: 191.5 LBS | HEART RATE: 76 BPM | TEMPERATURE: 98.6 F

## 2025-04-30 DIAGNOSIS — M25.571 CHRONIC PAIN OF RIGHT ANKLE: ICD-10-CM

## 2025-04-30 DIAGNOSIS — G89.29 CHRONIC PAIN OF RIGHT ANKLE: ICD-10-CM

## 2025-04-30 DIAGNOSIS — Z51.81 ENCOUNTER FOR THERAPEUTIC DRUG MONITORING: Primary | ICD-10-CM

## 2025-04-30 DIAGNOSIS — M54.40 LOW BACK PAIN WITH SCIATICA, SCIATICA LATERALITY UNSPECIFIED, UNSPECIFIED BACK PAIN LATERALITY, UNSPECIFIED CHRONICITY: ICD-10-CM

## 2025-04-30 DIAGNOSIS — G62.9 NEUROPATHY: ICD-10-CM

## 2025-04-30 RX ORDER — DICLOFENAC SODIUM 75 MG/1
75 TABLET, DELAYED RELEASE ORAL 2 TIMES DAILY
Qty: 60 TABLET | Refills: 11 | Status: SHIPPED | OUTPATIENT
Start: 2025-04-30

## 2025-04-30 RX ORDER — GABAPENTIN 800 MG/1
800 TABLET ORAL 3 TIMES DAILY
Qty: 90 TABLET | Refills: 3 | Status: SHIPPED | OUTPATIENT
Start: 2025-04-30

## 2025-04-30 NOTE — PROGRESS NOTES
Ivan Manning is a 49 y.o. male  Peripheral Neuropathy (Here for f/u, refill Gabapentin, Diclofenac to [facility] )      History of Present Illness  History of Present Illness  patient is a pleasant 49-year-old white male who comes in for peripheral neuropathy needs refill of gabapentin and diclofenac meds working well no problems or complaints no shortness of breath no chest pain no SI/HI concentration is good focus is good and small    The following portions of the patient's history were reviewed and updated as appropriate: allergies, current medications, past social history and problem list    Review of Systems   Constitutional:  Negative for fatigue and unexpected weight change.   Respiratory:  Negative for cough, chest tightness and shortness of breath.    Cardiovascular:  Negative for chest pain, palpitations and leg swelling.   Gastrointestinal:  Negative for nausea.   Skin:  Negative for color change and rash.   Neurological:  Negative for dizziness, syncope, weakness and headaches.       Objective     Vitals:    04/30/25 1524   BP: 142/88   Pulse: 76   Resp: 16   Temp: 98.6 °F (37 °C)   SpO2: 98%       Physical Exam  Vitals and nursing note reviewed.   Constitutional:       General: He is not in acute distress.     Appearance: Normal appearance. He is well-developed. He is not ill-appearing, toxic-appearing or diaphoretic.   Neck:      Vascular: No carotid bruit or JVD.   Cardiovascular:      Rate and Rhythm: Normal rate and regular rhythm.      Pulses: Normal pulses.      Heart sounds: Normal heart sounds. No murmur heard.  Pulmonary:      Effort: Pulmonary effort is normal. No respiratory distress.      Breath sounds: Normal breath sounds.   Abdominal:      Palpations: Abdomen is soft.      Tenderness: There is no abdominal tenderness.   Skin:     General: Skin is warm and dry.   Neurological:      Mental Status: He is alert.       Physical Exam      Assessment & Plan   Assessment &  Plan      Diagnoses and all orders for this visit:    1. Low back pain with sciatica, sciatica laterality unspecified, unspecified back pain laterality, unspecified chronicity  -     diclofenac (VOLTAREN) 75 MG EC tablet; Take 1 tablet by mouth 2 (Two) Times a Day.  Dispense: 60 tablet; Refill: 11    2. Neuropathy  -     gabapentin (NEURONTIN) 800 MG tablet; Take 1 tablet by mouth 3 (Three) Times a Day.  Dispense: 90 tablet; Refill: 3    3. Chronic pain of right ankle  -     gabapentin (NEURONTIN) 800 MG tablet; Take 1 tablet by mouth 3 (Three) Times a Day.  Dispense: 90 tablet; Refill: 3       As part of this patient's treatment plan, patient will be prescribed controlled substances. The patient has been made aware of appropriate use of such medications, including potential risk of somnolence, limited ability to drive and /or work safely, and potential for dependence or overdose. It has also been made clear that these medications are for use by this patient only, without concomitant use of alcohol or other substances unless prescribed.Controlled substance status of medication discussed with patient, discussed risks of medication including abuse potential and diversion potential and need to follow up for reevaluation appointment in order to receive further refills.    Part of this note may be an electronic transcription/translation of spoken language to printed text using the Dragon Dictation System.     Patient or patient representative verbalized consent for the use of Ambient Listening during the visit with  ANNY Wilkerson for chart documentation. 4/30/2025  15:38 EDT

## 2025-05-09 LAB — DRUGS UR: NORMAL

## 2025-05-20 ENCOUNTER — TELEPHONE (OUTPATIENT)
Dept: FAMILY MEDICINE CLINIC | Facility: CLINIC | Age: 50
End: 2025-05-20
Payer: MEDICAID

## 2025-05-20 RX ORDER — METHYLPREDNISOLONE 4 MG/1
TABLET ORAL
Qty: 1 EACH | Refills: 0 | Status: SHIPPED | OUTPATIENT
Start: 2025-05-20

## 2025-05-20 NOTE — TELEPHONE ENCOUNTER
Caller: Hussein Manning    Relationship: Self    Best call back number:       [phone] (Mobile)     What medication are you requesting:     MEDICATION FOR POISON IVY ON PATIENT'S HANDS AROUND EYES    PATIENT STATED THE AREA AROUND HIS EYES IS PUFFY/ITCHY    How long have you been experiencing symptoms:     SINCE YESTERDAY, 5/19    Have you had these symptoms before:    [x] Yes  [] No    Have you been treated for these symptoms before:   [x] Yes  [] No    If a prescription is needed, what is your preferred pharmacy and phone number:      [facility] [place] [place]    TELEPHONE CONTACT:    [phone]    PATIENT REQUESTED A CALL BACK